# Patient Record
Sex: MALE | Race: WHITE | NOT HISPANIC OR LATINO | Employment: OTHER | URBAN - METROPOLITAN AREA
[De-identification: names, ages, dates, MRNs, and addresses within clinical notes are randomized per-mention and may not be internally consistent; named-entity substitution may affect disease eponyms.]

---

## 2022-07-07 ENCOUNTER — APPOINTMENT (OUTPATIENT)
Dept: RADIOLOGY | Facility: MEDICAL CENTER | Age: 77
End: 2022-07-07
Attending: EMERGENCY MEDICINE
Payer: COMMERCIAL

## 2022-07-07 ENCOUNTER — HOSPITAL ENCOUNTER (OUTPATIENT)
Facility: MEDICAL CENTER | Age: 77
End: 2022-07-09
Attending: EMERGENCY MEDICINE | Admitting: STUDENT IN AN ORGANIZED HEALTH CARE EDUCATION/TRAINING PROGRAM
Payer: COMMERCIAL

## 2022-07-07 DIAGNOSIS — K57.92 DIVERTICULITIS: ICD-10-CM

## 2022-07-07 DIAGNOSIS — R79.89 ELEVATED TROPONIN: ICD-10-CM

## 2022-07-07 LAB
ALBUMIN SERPL BCP-MCNC: 4.3 G/DL (ref 3.2–4.9)
ALBUMIN/GLOB SERPL: 1.4 G/DL
ALP SERPL-CCNC: 69 U/L (ref 30–99)
ALT SERPL-CCNC: 15 U/L (ref 2–50)
ANION GAP SERPL CALC-SCNC: 12 MMOL/L (ref 7–16)
AST SERPL-CCNC: 12 U/L (ref 12–45)
BASOPHILS # BLD AUTO: 0.4 % (ref 0–1.8)
BASOPHILS # BLD: 0.08 K/UL (ref 0–0.12)
BILIRUB SERPL-MCNC: 1.4 MG/DL (ref 0.1–1.5)
BUN SERPL-MCNC: 20 MG/DL (ref 8–22)
CALCIUM SERPL-MCNC: 9.3 MG/DL (ref 8.5–10.5)
CHLORIDE SERPL-SCNC: 102 MMOL/L (ref 96–112)
CO2 SERPL-SCNC: 23 MMOL/L (ref 20–33)
CREAT SERPL-MCNC: 1.23 MG/DL (ref 0.5–1.4)
EOSINOPHIL # BLD AUTO: 0.08 K/UL (ref 0–0.51)
EOSINOPHIL NFR BLD: 0.4 % (ref 0–6.9)
ERYTHROCYTE [DISTWIDTH] IN BLOOD BY AUTOMATED COUNT: 46.3 FL (ref 35.9–50)
GFR SERPLBLD CREATININE-BSD FMLA CKD-EPI: 60 ML/MIN/1.73 M 2
GLOBULIN SER CALC-MCNC: 3.1 G/DL (ref 1.9–3.5)
GLUCOSE SERPL-MCNC: 98 MG/DL (ref 65–99)
HCT VFR BLD AUTO: 44.9 % (ref 42–52)
HGB BLD-MCNC: 15.6 G/DL (ref 14–18)
IMM GRANULOCYTES # BLD AUTO: 0.11 K/UL (ref 0–0.11)
IMM GRANULOCYTES NFR BLD AUTO: 0.6 % (ref 0–0.9)
LYMPHOCYTES # BLD AUTO: 1.63 K/UL (ref 1–4.8)
LYMPHOCYTES NFR BLD: 9.1 % (ref 22–41)
MCH RBC QN AUTO: 32.2 PG (ref 27–33)
MCHC RBC AUTO-ENTMCNC: 34.7 G/DL (ref 33.7–35.3)
MCV RBC AUTO: 92.8 FL (ref 81.4–97.8)
MONOCYTES # BLD AUTO: 1.18 K/UL (ref 0–0.85)
MONOCYTES NFR BLD AUTO: 6.6 % (ref 0–13.4)
NEUTROPHILS # BLD AUTO: 14.82 K/UL (ref 1.82–7.42)
NEUTROPHILS NFR BLD: 82.9 % (ref 44–72)
NRBC # BLD AUTO: 0 K/UL
NRBC BLD-RTO: 0 /100 WBC
PLATELET # BLD AUTO: 346 K/UL (ref 164–446)
PMV BLD AUTO: 9.1 FL (ref 9–12.9)
POTASSIUM SERPL-SCNC: 4.5 MMOL/L (ref 3.6–5.5)
PROT SERPL-MCNC: 7.4 G/DL (ref 6–8.2)
RBC # BLD AUTO: 4.84 M/UL (ref 4.7–6.1)
SODIUM SERPL-SCNC: 137 MMOL/L (ref 135–145)
WBC # BLD AUTO: 17.9 K/UL (ref 4.8–10.8)

## 2022-07-07 PROCEDURE — 99285 EMERGENCY DEPT VISIT HI MDM: CPT

## 2022-07-07 PROCEDURE — 36415 COLL VENOUS BLD VENIPUNCTURE: CPT

## 2022-07-07 PROCEDURE — 83605 ASSAY OF LACTIC ACID: CPT

## 2022-07-07 PROCEDURE — 84484 ASSAY OF TROPONIN QUANT: CPT

## 2022-07-07 PROCEDURE — 74176 CT ABD & PELVIS W/O CONTRAST: CPT | Mod: ME

## 2022-07-07 PROCEDURE — 93005 ELECTROCARDIOGRAM TRACING: CPT | Performed by: EMERGENCY MEDICINE

## 2022-07-07 PROCEDURE — 85025 COMPLETE CBC W/AUTO DIFF WBC: CPT

## 2022-07-07 PROCEDURE — 87077 CULTURE AEROBIC IDENTIFY: CPT

## 2022-07-07 PROCEDURE — 87040 BLOOD CULTURE FOR BACTERIA: CPT

## 2022-07-07 PROCEDURE — 80053 COMPREHEN METABOLIC PANEL: CPT

## 2022-07-07 PROCEDURE — 93005 ELECTROCARDIOGRAM TRACING: CPT

## 2022-07-07 PROCEDURE — 87150 DNA/RNA AMPLIFIED PROBE: CPT

## 2022-07-07 PROCEDURE — C9803 HOPD COVID-19 SPEC COLLECT: HCPCS | Performed by: EMERGENCY MEDICINE

## 2022-07-07 PROCEDURE — 0241U HCHG SARS-COV-2 COVID-19 NFCT DS RESP RNA 4 TRGT MIC: CPT

## 2022-07-07 PROCEDURE — 71045 X-RAY EXAM CHEST 1 VIEW: CPT

## 2022-07-07 ASSESSMENT — LIFESTYLE VARIABLES: DO YOU DRINK ALCOHOL: NO

## 2022-07-08 PROBLEM — Z72.0 TOBACCO ABUSE: Status: ACTIVE | Noted: 2022-07-08

## 2022-07-08 PROBLEM — N40.0 BPH (BENIGN PROSTATIC HYPERPLASIA): Status: ACTIVE | Noted: 2022-07-08

## 2022-07-08 PROBLEM — D72.9 NEUTROPHILIC LEUKOCYTOSIS: Status: ACTIVE | Noted: 2022-07-08

## 2022-07-08 PROBLEM — K57.92 DIVERTICULITIS: Status: ACTIVE | Noted: 2022-07-08

## 2022-07-08 LAB
EKG IMPRESSION: NORMAL
FLUAV RNA SPEC QL NAA+PROBE: NEGATIVE
FLUBV RNA SPEC QL NAA+PROBE: NEGATIVE
LACTATE SERPL-SCNC: 1.3 MMOL/L (ref 0.5–2)
RSV RNA SPEC QL NAA+PROBE: NEGATIVE
SARS-COV-2 RNA RESP QL NAA+PROBE: NOTDETECTED
SPECIMEN SOURCE: NORMAL
TROPONIN T SERPL-MCNC: 22 NG/L (ref 6–19)

## 2022-07-08 PROCEDURE — 99220 PR INITIAL OBSERVATION CARE,LEVL III: CPT | Performed by: STUDENT IN AN ORGANIZED HEALTH CARE EDUCATION/TRAINING PROGRAM

## 2022-07-08 PROCEDURE — 700105 HCHG RX REV CODE 258: Performed by: STUDENT IN AN ORGANIZED HEALTH CARE EDUCATION/TRAINING PROGRAM

## 2022-07-08 PROCEDURE — 700101 HCHG RX REV CODE 250: Performed by: STUDENT IN AN ORGANIZED HEALTH CARE EDUCATION/TRAINING PROGRAM

## 2022-07-08 PROCEDURE — G0378 HOSPITAL OBSERVATION PER HR: HCPCS

## 2022-07-08 PROCEDURE — 96372 THER/PROPH/DIAG INJ SC/IM: CPT | Mod: XU

## 2022-07-08 PROCEDURE — 96366 THER/PROPH/DIAG IV INF ADDON: CPT

## 2022-07-08 PROCEDURE — 700111 HCHG RX REV CODE 636 W/ 250 OVERRIDE (IP): Performed by: STUDENT IN AN ORGANIZED HEALTH CARE EDUCATION/TRAINING PROGRAM

## 2022-07-08 PROCEDURE — 700102 HCHG RX REV CODE 250 W/ 637 OVERRIDE(OP): Performed by: STUDENT IN AN ORGANIZED HEALTH CARE EDUCATION/TRAINING PROGRAM

## 2022-07-08 PROCEDURE — 87040 BLOOD CULTURE FOR BACTERIA: CPT

## 2022-07-08 PROCEDURE — 96365 THER/PROPH/DIAG IV INF INIT: CPT

## 2022-07-08 PROCEDURE — A9270 NON-COVERED ITEM OR SERVICE: HCPCS | Performed by: STUDENT IN AN ORGANIZED HEALTH CARE EDUCATION/TRAINING PROGRAM

## 2022-07-08 PROCEDURE — 96375 TX/PRO/DX INJ NEW DRUG ADDON: CPT

## 2022-07-08 PROCEDURE — 96376 TX/PRO/DX INJ SAME DRUG ADON: CPT

## 2022-07-08 RX ORDER — TAMSULOSIN HYDROCHLORIDE 0.4 MG/1
0.4 CAPSULE ORAL
Status: DISCONTINUED | OUTPATIENT
Start: 2022-07-08 | End: 2022-07-09 | Stop reason: HOSPADM

## 2022-07-08 RX ORDER — TRIAMCINOLONE ACETONIDE 5 MG/G
OINTMENT TOPICAL 2 TIMES DAILY
COMMUNITY
Start: 2022-04-05 | End: 2022-11-28

## 2022-07-08 RX ORDER — SODIUM CHLORIDE, SODIUM LACTATE, POTASSIUM CHLORIDE, CALCIUM CHLORIDE 600; 310; 30; 20 MG/100ML; MG/100ML; MG/100ML; MG/100ML
INJECTION, SOLUTION INTRAVENOUS CONTINUOUS
Status: DISCONTINUED | OUTPATIENT
Start: 2022-07-08 | End: 2022-07-08

## 2022-07-08 RX ORDER — METRONIDAZOLE 500 MG/100ML
500 INJECTION, SOLUTION INTRAVENOUS EVERY 8 HOURS
Status: DISCONTINUED | OUTPATIENT
Start: 2022-07-08 | End: 2022-07-09 | Stop reason: HOSPADM

## 2022-07-08 RX ORDER — HEPARIN SODIUM 5000 [USP'U]/ML
5000 INJECTION, SOLUTION INTRAVENOUS; SUBCUTANEOUS EVERY 8 HOURS
Status: DISCONTINUED | OUTPATIENT
Start: 2022-07-08 | End: 2022-07-09 | Stop reason: HOSPADM

## 2022-07-08 RX ORDER — TRAZODONE HYDROCHLORIDE 150 MG/1
150 TABLET ORAL NIGHTLY PRN
COMMUNITY
Start: 2021-10-22 | End: 2023-06-20

## 2022-07-08 RX ORDER — TAMSULOSIN HYDROCHLORIDE 0.4 MG/1
0.8 CAPSULE ORAL DAILY
COMMUNITY
Start: 2021-10-22 | End: 2023-06-20

## 2022-07-08 RX ORDER — ONDANSETRON 2 MG/ML
4 INJECTION INTRAMUSCULAR; INTRAVENOUS
Status: DISCONTINUED | OUTPATIENT
Start: 2022-07-08 | End: 2022-07-09 | Stop reason: HOSPADM

## 2022-07-08 RX ORDER — HYDROCODONE BITARTRATE AND ACETAMINOPHEN 10; 325 MG/1; MG/1
1 TABLET ORAL EVERY 6 HOURS PRN
COMMUNITY
Start: 2022-06-09 | End: 2022-11-29

## 2022-07-08 RX ORDER — PANTOPRAZOLE SODIUM 40 MG/1
40 TABLET, DELAYED RELEASE ORAL 2 TIMES DAILY
COMMUNITY
Start: 2021-10-22 | End: 2022-11-28

## 2022-07-08 RX ORDER — FAMOTIDINE 40 MG/1
40 TABLET, FILM COATED ORAL DAILY
COMMUNITY
Start: 2022-05-12 | End: 2022-11-28

## 2022-07-08 RX ORDER — MORPHINE SULFATE 4 MG/ML
1 INJECTION INTRAVENOUS EVERY 4 HOURS PRN
Status: DISCONTINUED | OUTPATIENT
Start: 2022-07-08 | End: 2022-07-09 | Stop reason: HOSPADM

## 2022-07-08 RX ADMIN — METRONIDAZOLE 500 MG: 500 INJECTION, SOLUTION INTRAVENOUS at 15:59

## 2022-07-08 RX ADMIN — METRONIDAZOLE 500 MG: 500 INJECTION, SOLUTION INTRAVENOUS at 22:52

## 2022-07-08 RX ADMIN — MORPHINE SULFATE 1 MG: 4 INJECTION INTRAVENOUS at 03:59

## 2022-07-08 RX ADMIN — METRONIDAZOLE 500 MG: 500 INJECTION, SOLUTION INTRAVENOUS at 03:56

## 2022-07-08 RX ADMIN — TAMSULOSIN HYDROCHLORIDE 0.4 MG: 0.4 CAPSULE ORAL at 09:49

## 2022-07-08 RX ADMIN — CEFTRIAXONE SODIUM 1 G: 10 INJECTION, POWDER, FOR SOLUTION INTRAVENOUS at 04:36

## 2022-07-08 RX ADMIN — MORPHINE SULFATE 1 MG: 4 INJECTION INTRAVENOUS at 20:36

## 2022-07-08 RX ADMIN — HEPARIN SODIUM 5000 UNITS: 5000 INJECTION, SOLUTION INTRAVENOUS; SUBCUTANEOUS at 03:56

## 2022-07-08 RX ADMIN — HEPARIN SODIUM 5000 UNITS: 5000 INJECTION, SOLUTION INTRAVENOUS; SUBCUTANEOUS at 14:49

## 2022-07-08 RX ADMIN — HEPARIN SODIUM 5000 UNITS: 5000 INJECTION, SOLUTION INTRAVENOUS; SUBCUTANEOUS at 22:57

## 2022-07-08 RX ADMIN — SODIUM CHLORIDE, POTASSIUM CHLORIDE, SODIUM LACTATE AND CALCIUM CHLORIDE: 600; 310; 30; 20 INJECTION, SOLUTION INTRAVENOUS at 04:45

## 2022-07-08 ASSESSMENT — LIFESTYLE VARIABLES
HAVE YOU EVER FELT YOU SHOULD CUT DOWN ON YOUR DRINKING: NO
HOW MANY TIMES IN THE PAST YEAR HAVE YOU HAD 5 OR MORE DRINKS IN A DAY: 0
ON A TYPICAL DAY WHEN YOU DRINK ALCOHOL HOW MANY DRINKS DO YOU HAVE: 0
TOTAL SCORE: 0
EVER HAD A DRINK FIRST THING IN THE MORNING TO STEADY YOUR NERVES TO GET RID OF A HANGOVER: NO
TOTAL SCORE: 0
CONSUMPTION TOTAL: NEGATIVE
ALCOHOL_USE: YES
EVER FELT BAD OR GUILTY ABOUT YOUR DRINKING: NO
TOTAL SCORE: 0
AVERAGE NUMBER OF DAYS PER WEEK YOU HAVE A DRINK CONTAINING ALCOHOL: 0
HAVE PEOPLE ANNOYED YOU BY CRITICIZING YOUR DRINKING: NO

## 2022-07-08 ASSESSMENT — ENCOUNTER SYMPTOMS
MUSCULOSKELETAL NEGATIVE: 1
PSYCHIATRIC NEGATIVE: 1
NEUROLOGICAL NEGATIVE: 1
SHORTNESS OF BREATH: 1
ABDOMINAL PAIN: 1
COUGH: 1
NAUSEA: 1
CARDIOVASCULAR NEGATIVE: 1
EYES NEGATIVE: 1

## 2022-07-08 ASSESSMENT — PATIENT HEALTH QUESTIONNAIRE - PHQ9
SUM OF ALL RESPONSES TO PHQ9 QUESTIONS 1 AND 2: 0
2. FEELING DOWN, DEPRESSED, IRRITABLE, OR HOPELESS: NOT AT ALL
1. LITTLE INTEREST OR PLEASURE IN DOING THINGS: NOT AT ALL

## 2022-07-08 ASSESSMENT — PAIN DESCRIPTION - PAIN TYPE
TYPE: CHRONIC PAIN
TYPE: CHRONIC PAIN

## 2022-07-08 NOTE — ED TRIAGE NOTES
Chief Complaint   Patient presents with   • Diarrhea     x10 days   • Nausea     x10 days w/o vomiting   • Chills     x4 days with occasional   • Cough     x7 days; wet-sounding   • Shortness of Breath     x7 days since pt has been coughing     Pt ambulatory to triage with wife for above complaint. Pt states he called his PCM and they told him to come straight in for evaluation to rule out pneumonia. SOB protocol ordered and labs drawn in triage room. Pt sent for EKG after triage.     Pt is alert/oriented and follows commands. Pt speaking in full sentences and responds appropriately to questions. No acute distress noted in triage and respirations are even and unlabored.     Pt placed in lobby and educated on triage process. Pt and wife encouraged to alert staff for any changes in condition.

## 2022-07-08 NOTE — ED NOTES
Pt resting in [facility], VSS, NAD. Pt updated on POC and has no further requests at this time

## 2022-07-08 NOTE — PROGRESS NOTES
Patient was admitted earlier today. Please refer H&P for details    77 y.o. male who presented 7/7/2022 with lower quadrant abdominal pain for the last week and a half. He was found to have leukocytosis. CT abd shows extensive colonic diverticulosis with probable early or mild diverticulitis in the distal descending colon. COVID test neg.    Plans  - cont iv abx Rocephin and flagyl  - advance diet as tolerated  - follow up blood culture and cbc

## 2022-07-08 NOTE — ED NOTES
Pt resting comfortably in bed, respirations even/unlabored. No acute distress noted at this time.

## 2022-07-08 NOTE — ED PROVIDER NOTES
"ED Provider Note    CHIEF COMPLAINT  Chief Complaint   Patient presents with   • Diarrhea     x10 days   • Nausea     x10 days w/o vomiting   • Chills     x4 days with occasional   • Cough     x7 days; wet-sounding   • Shortness of Breath     x7 days since pt has been coughing       HPI  June Ventura is a 77 y.o. male who presents to the emergency department complaining of nausea, vomiting, diarrhea in addition to fatigue, chills, riders. Also notes some cough and shortness of breath. All of the above has been over the last 1 to 2 weeks. States that he is otherwise healthy and does not take any prescription medications. Chronic smoker. Occasional drinker. No prior evaluation by cardiology. No local or current PCP.    REVIEW OF SYSTEMS  See HPI for further details. All other systems are negative.     PAST MEDICAL HISTORY   has a past medical history of Chronic back pain.    SOCIAL HISTORY  Social History     Tobacco Use   • Smoking status: Current Every Day Smoker     Types: Cigarettes   • Smokeless tobacco: Never Used   Vaping Use   • Vaping Use: Never used   Substance and Sexual Activity   • Alcohol use: Yes     Comment: \"a beer every 3-4 months\"   • Drug use: Not Currently   • Sexual activity: Not on file       SURGICAL HISTORY   has a past surgical history that includes other orthopedic surgery (1986) and appendectomy.    CURRENT MEDICATIONS  Home Medications     Reviewed by Imelda Mendiola R.N. (Registered Nurse) on 07/07/22 at 2112  Med List Status: Not Addressed   Medication Last Dose Status        Patient David Taking any Medications                       ALLERGIES  Allergies   Allergen Reactions   • Talwin Unspecified     aggression and violence       PHYSICAL EXAM  VITAL SIGNS: /59   Pulse 77   Temp 36.2 °C (97.1 °F) (Temporal)   Resp 18   Ht 1.702 m (5' 7\")   Wt 74.2 kg (163 lb 9.3 oz)   SpO2 97%   BMI 25.62 kg/m²  @BEBA[[id number]::@   Pulse ox interpretation: I interpret this pulse ox as " normal.  Constitutional: Alert in no apparent distress.  HENT: No signs of trauma, Bilateral external ears normal, Nose normal.   Eyes: Pupils are equal and reactive  Neck: Normal range of motion, No tenderness, Supple  Cardiovascular: Regular rate and rhythm, no murmurs.   Thorax & Lungs: Normal breath sounds, No respiratory distress, No wheezing, No chest tenderness.   Abdomen: Bowel sounds normal, Soft, + LLQ tenderness  Skin: Warm, Dry, No erythema, No rash.   Extremities: Intact distal pulses  Musculoskeletal: Good range of motion in all major joints. No tenderness to palpation or major deformities noted.   Neurologic: Alert , Normal motor function, Normal sensory function, No focal deficits noted.   Psychiatric: Affect normal, Judgment normal, Mood normal.       DIAGNOSTIC STUDIES / PROCEDURES      LABS  Results for orders placed or performed during the hospital encounter of 07/07/22   CBC with Differential   Result Value Ref Range    WBC 17.9 (H) 4.8 - 10.8 K/uL    RBC 4.84 4.70 - 6.10 M/uL    Hemoglobin 15.6 14.0 - 18.0 g/dL    Hematocrit 44.9 42.0 - 52.0 %    MCV 92.8 81.4 - 97.8 fL    MCH 32.2 27.0 - 33.0 pg    MCHC 34.7 33.7 - 35.3 g/dL    RDW 46.3 35.9 - 50.0 fL    Platelet Count 346 164 - 446 K/uL    MPV 9.1 9.0 - 12.9 fL    Neutrophils-Polys 82.90 (H) 44.00 - 72.00 %    Lymphocytes 9.10 (L) 22.00 - 41.00 %    Monocytes 6.60 0.00 - 13.40 %    Eosinophils 0.40 0.00 - 6.90 %    Basophils 0.40 0.00 - 1.80 %    Immature Granulocytes 0.60 0.00 - 0.90 %    Nucleated RBC 0.00 /100 WBC    Neutrophils (Absolute) 14.82 (H) 1.82 - 7.42 K/uL    Lymphs (Absolute) 1.63 1.00 - 4.80 K/uL    Monos (Absolute) 1.18 (H) 0.00 - 0.85 K/uL    Eos (Absolute) 0.08 0.00 - 0.51 K/uL    Baso (Absolute) 0.08 0.00 - 0.12 K/uL    Immature Granulocytes (abs) 0.11 0.00 - 0.11 K/uL    NRBC (Absolute) 0.00 K/uL   Comp Metabolic Panel   Result Value Ref Range    Sodium 137 135 - 145 mmol/L    Potassium 4.5 3.6 - 5.5 mmol/L    Chloride 102  96 - 112 mmol/L    Co2 23 20 - 33 mmol/L    Anion Gap 12.0 7.0 - 16.0    Glucose 98 65 - 99 mg/dL    Bun 20 8 - 22 mg/dL    Creatinine 1.23 0.50 - 1.40 mg/dL    Calcium 9.3 8.5 - 10.5 mg/dL    AST(SGOT) 12 12 - 45 U/L    ALT(SGPT) 15 2 - 50 U/L    Alkaline Phosphatase 69 30 - 99 U/L    Total Bilirubin 1.4 0.1 - 1.5 mg/dL    Albumin 4.3 3.2 - 4.9 g/dL    Total Protein 7.4 6.0 - 8.2 g/dL    Globulin 3.1 1.9 - 3.5 g/dL    A-G Ratio 1.4 g/dL   ESTIMATED GFR   Result Value Ref Range    GFR (CKD-EPI) 60 >60 mL/min/1.73 m 2   Troponin   Result Value Ref Range    Troponin T 22 (H) 6 - 19 ng/L   Lactic Acid   Result Value Ref Range    Lactic Acid 1.3 0.5 - 2.0 mmol/L   COV-2, FLU A/B, AND RSV BY PCR (2-4 HOURS CEPHEID): Collect NP swab in VTM    Specimen: Nasopharyngeal; Respirate   Result Value Ref Range    Influenza virus A RNA Negative Negative    Influenza virus B, PCR Negative Negative    RSV, PCR Negative Negative    SARS-CoV-2 by PCR NotDetected     SARS-CoV-2 Source NP Swab    EKG   Result Value Ref Range    Report       [facility] Emergency Dept.    Test Date:  2022  Pt Name:    BOOGIE ALFORD                  Department: ER  MRN:        [id number]                      Room:  Gender:     Male                         Technician: [id number]  :        1945                   Requested By:ER TRIAGE PROTOCOL  Order #:    [id number]                    Reading MD: Freedom Rosenthal    Measurements  Intervals                                Axis  Rate:       79                           P:          67  MS:         178                          QRS:        28  QRSD:       81                           T:          63  QT:         357  QTc:        410    Interpretive Statements  Sinus rhythm  Probable left atrial enlargement  ST elevation, consider lateral injury  No previous ECG available for comparison  Electronically Signed On 2022 1:16:15 PDT by Freedom Rosenthal           RADIOLOGY  CT-ABDOMEN-PELVIS  W/O   Final Result      1.  Extensive colonic diverticulosis with probable early or mild diverticulitis in the distal descending colon.   2.  No evidence of bowel obstruction or perforation.   3.  Gallbladder full of stones.   4.  Diffuse atherosclerotic changes and mild ectasia of the abdominal aorta measuring up to 3 cm in diameter.      DX-CHEST-PORTABLE (1 VIEW)   Final Result      No acute cardiopulmonary disease.              COURSE & MEDICAL DECISION MAKING  Pertinent Labs & Imaging studies reviewed. (See chart for details)  77-year-old male presented emergency department with the above presentation. Workup significant for diverticulitis. Also significant leukocytosis. Chest x-ray negative. No other findings of infection however given his current clinical appearance, elevated troponin and bowel inflammatory process I will have brought into the hospital service for ongoing inpatient care and workup.      FINAL IMPRESSION  1. Diverticulitis    2. Elevated troponin            Electronically signed by: Freedom Rosenthal M.D., 7/8/2022 1:45 AM

## 2022-07-08 NOTE — ED NOTES
Report from NOC RN. Pt resting in [facility], NAD. Pt updated on POC and has no further requests at this time

## 2022-07-08 NOTE — H&P
Hospital Medicine History & Physical Note    Date of Service  7/8/2022    Primary Care Physician  No primary care provider on file.    Consultants  None    Code Status  Full Code    Chief Complaint  Chief Complaint   Patient presents with   • Diarrhea     x10 days   • Nausea     x10 days w/o vomiting   • Chills     x4 days with occasional   • Cough     x7 days; wet-sounding   • Shortness of Breath     x7 days since pt has been coughing       History of Presenting Illness  June Ventura is a 77 y.o. male who presented 7/7/2022 with lower quadrant abdominal pain for the last week and a half.  States that it is sharp comes and goes.  Associated with nausea and belching, however no vomitus.  He also endorses dry cough and intermittent shortness of breath.  He initially presented to [place] several days ago for the symptoms.  He was immediately quarantined for suspicion of COVID and was discharged when his test came back -4 days later.  He was then sent home, to which his symptoms still persisted inside to come to [facility] for further evaluation.    Patient has an extensive smoking history, has been smoking for last 60 years.  Has never been tested for COPD.  Denies alcohol or illicit drug use.    In the ED, patient found abnormal vital signs.  Remarkable labs include neutrophilic leukocytosis.  COVID test negative.  Chest x-ray negative for acute cardiopulmonary abnormality.  CAT scan abdomen showed extensive colonic diverticulosis with probable early or mild diverticulitis in the distal descending colon.    I discussed the plan of care with patient.    Review of Systems  Review of Systems   Constitutional: Positive for malaise/fatigue.   HENT: Negative.    Eyes: Negative.    Respiratory: Positive for cough and shortness of breath.    Cardiovascular: Negative.    Gastrointestinal: Positive for abdominal pain and nausea.   Genitourinary: Negative.    Musculoskeletal: Negative.    Skin: Negative.    Neurological:  Negative.    Endo/Heme/Allergies: Negative.    Psychiatric/Behavioral: Negative.        Past Medical History   has a past medical history of Chronic back pain.    Surgical History   has a past surgical history that includes other orthopedic surgery (1986) and appendectomy.     Family History   Family history reviewed with patient. There is no family history that is pertinent to the chief complaint.     Social History   reports that he has been smoking cigarettes. He has never used smokeless tobacco. He reports current alcohol use. He reports previous drug use.    Allergies  Allergies   Allergen Reactions   • Talwin Unspecified     aggression and violence       Medications  None       Physical Exam  Temp:  [36.2 °C (97.1 °F)] 36.2 °C (97.1 °F)  Pulse:  [77-88] 77  Resp:  [16-18] 18  BP: (106-125)/(59-61) 125/59  SpO2:  [97 %-98 %] 97 %  Blood Pressure : 125/59   Temperature: 36.2 °C (97.1 °F)   Pulse: 77   Respiration: 18   Pulse Oximetry: 97 %       Physical Exam  Constitutional:       Appearance: Normal appearance. He is normal weight.   HENT:      Head: Normocephalic.      Nose: Nose normal.      Mouth/Throat:      Mouth: Mucous membranes are moist.   Eyes:      Pupils: Pupils are equal, round, and reactive to light.   Cardiovascular:      Rate and Rhythm: Normal rate and regular rhythm.   Pulmonary:      Effort: Pulmonary effort is normal.      Breath sounds: Normal breath sounds.   Abdominal:      General: Abdomen is flat. Bowel sounds are normal.      Palpations: Abdomen is soft.      Comments: Endorses pain upon palpation of bilateral lower quadrants, more prominent in the left lower quadrant.  No signs of rigidity.  No rebound tenderness.  No guarding.   Musculoskeletal:         General: Normal range of motion.      Cervical back: Normal range of motion.   Skin:     General: Skin is warm.   Neurological:      General: No focal deficit present.      Mental Status: He is alert. Mental status is at baseline.          Laboratory:  Recent Labs     07/07/22 1915   WBC 17.9*   RBC 4.84   HEMOGLOBIN 15.6   HEMATOCRIT 44.9   MCV 92.8   MCH 32.2   MCHC 34.7   RDW 46.3   PLATELETCT 346   MPV 9.1     Recent Labs     07/07/22 1915   SODIUM 137   POTASSIUM 4.5   CHLORIDE 102   CO2 23   GLUCOSE 98   BUN 20   CREATININE 1.23   CALCIUM 9.3     Recent Labs     07/07/22 1915   ALTSGPT 15   ASTSGOT 12   ALKPHOSPHAT 69   TBILIRUBIN 1.4   GLUCOSE 98         No results for input(s): NTPROBNP in the last 72 hours.      Recent Labs     07/07/22  2339   TROPONINT 22*       Imaging:  CT-ABDOMEN-PELVIS W/O   Final Result      1.  Extensive colonic diverticulosis with probable early or mild diverticulitis in the distal descending colon.   2.  No evidence of bowel obstruction or perforation.   3.  Gallbladder full of stones.   4.  Diffuse atherosclerotic changes and mild ectasia of the abdominal aorta measuring up to 3 cm in diameter.      DX-CHEST-PORTABLE (1 VIEW)   Final Result      No acute cardiopulmonary disease.          no X-Ray or EKG requiring interpretation    Assessment/Plan:  Justification for Admission Status  I anticipate this patient is appropriate for observation status at this time because He is diverticulitis and is able to tolerate p.o. benign abdominal exam.  Likely discharge tomorrow    * Diverticulitis  Assessment & Plan  Admit patient to medical floor  Rocephin flagyl  Fluids  Pain medications  Serial abdominal exam    BPH (benign prostatic hyperplasia)  Assessment & Plan  Continue Flomax    Tobacco abuse  Assessment & Plan  Counseled on benefits of tobacco cessation  Will need to have evaluation for COPD outpatient    Neutrophilic leukocytosis  Assessment & Plan  Likely from diverticulitis   Serial CBC      VTE prophylaxis: heparin ppx

## 2022-07-08 NOTE — ED NOTES
Pt medicated per MAR, pt tolerated well. Pt requesting blankets, socks and juice. Pt accommodated for comfort. Pt denies any other needs at this time. NAD.

## 2022-07-09 ENCOUNTER — PHARMACY VISIT (OUTPATIENT)
Dept: PHARMACY | Facility: MEDICAL CENTER | Age: 77
End: 2022-07-09
Payer: COMMERCIAL

## 2022-07-09 VITALS
TEMPERATURE: 97.7 F | HEIGHT: 67 IN | OXYGEN SATURATION: 94 % | SYSTOLIC BLOOD PRESSURE: 119 MMHG | HEART RATE: 74 BPM | RESPIRATION RATE: 16 BRPM | WEIGHT: 163.58 LBS | BODY MASS INDEX: 25.67 KG/M2 | DIASTOLIC BLOOD PRESSURE: 50 MMHG

## 2022-07-09 LAB
ERYTHROCYTE [DISTWIDTH] IN BLOOD BY AUTOMATED COUNT: 44.5 FL (ref 35.9–50)
HCT VFR BLD AUTO: 38.7 % (ref 42–52)
HGB BLD-MCNC: 13.6 G/DL (ref 14–18)
MCH RBC QN AUTO: 32.2 PG (ref 27–33)
MCHC RBC AUTO-ENTMCNC: 35.1 G/DL (ref 33.7–35.3)
MCV RBC AUTO: 91.5 FL (ref 81.4–97.8)
PLATELET # BLD AUTO: 296 K/UL (ref 164–446)
PMV BLD AUTO: 9.1 FL (ref 9–12.9)
RBC # BLD AUTO: 4.23 M/UL (ref 4.7–6.1)
TROPONIN T SERPL-MCNC: 19 NG/L (ref 6–19)
WBC # BLD AUTO: 7.8 K/UL (ref 4.8–10.8)

## 2022-07-09 PROCEDURE — 85027 COMPLETE CBC AUTOMATED: CPT

## 2022-07-09 PROCEDURE — 700111 HCHG RX REV CODE 636 W/ 250 OVERRIDE (IP): Performed by: STUDENT IN AN ORGANIZED HEALTH CARE EDUCATION/TRAINING PROGRAM

## 2022-07-09 PROCEDURE — RXMED WILLOW AMBULATORY MEDICATION CHARGE: Performed by: STUDENT IN AN ORGANIZED HEALTH CARE EDUCATION/TRAINING PROGRAM

## 2022-07-09 PROCEDURE — 36415 COLL VENOUS BLD VENIPUNCTURE: CPT

## 2022-07-09 PROCEDURE — G0378 HOSPITAL OBSERVATION PER HR: HCPCS

## 2022-07-09 PROCEDURE — A9270 NON-COVERED ITEM OR SERVICE: HCPCS | Performed by: NURSE PRACTITIONER

## 2022-07-09 PROCEDURE — 700102 HCHG RX REV CODE 250 W/ 637 OVERRIDE(OP): Performed by: STUDENT IN AN ORGANIZED HEALTH CARE EDUCATION/TRAINING PROGRAM

## 2022-07-09 PROCEDURE — A9270 NON-COVERED ITEM OR SERVICE: HCPCS | Performed by: STUDENT IN AN ORGANIZED HEALTH CARE EDUCATION/TRAINING PROGRAM

## 2022-07-09 PROCEDURE — 700102 HCHG RX REV CODE 250 W/ 637 OVERRIDE(OP): Performed by: NURSE PRACTITIONER

## 2022-07-09 PROCEDURE — 84484 ASSAY OF TROPONIN QUANT: CPT

## 2022-07-09 PROCEDURE — 99217 PR OBSERVATION CARE DISCHARGE: CPT | Performed by: STUDENT IN AN ORGANIZED HEALTH CARE EDUCATION/TRAINING PROGRAM

## 2022-07-09 PROCEDURE — 700101 HCHG RX REV CODE 250: Performed by: STUDENT IN AN ORGANIZED HEALTH CARE EDUCATION/TRAINING PROGRAM

## 2022-07-09 PROCEDURE — 87040 BLOOD CULTURE FOR BACTERIA: CPT

## 2022-07-09 RX ORDER — AMOXICILLIN AND CLAVULANATE POTASSIUM 875; 125 MG/1; MG/1
1 TABLET, FILM COATED ORAL 2 TIMES DAILY
Qty: 10 TABLET | Refills: 0 | Status: SHIPPED | OUTPATIENT
Start: 2022-07-09 | End: 2022-07-14

## 2022-07-09 RX ORDER — TRAZODONE HYDROCHLORIDE 150 MG/1
150 TABLET ORAL ONCE
Status: COMPLETED | OUTPATIENT
Start: 2022-07-09 | End: 2022-07-09

## 2022-07-09 RX ADMIN — TRAZODONE HYDROCHLORIDE 150 MG: 150 TABLET ORAL at 03:11

## 2022-07-09 RX ADMIN — CEFTRIAXONE SODIUM 1 G: 10 INJECTION, POWDER, FOR SOLUTION INTRAVENOUS at 06:22

## 2022-07-09 RX ADMIN — METRONIDAZOLE 500 MG: 500 INJECTION, SOLUTION INTRAVENOUS at 13:32

## 2022-07-09 RX ADMIN — TAMSULOSIN HYDROCHLORIDE 0.4 MG: 0.4 CAPSULE ORAL at 07:30

## 2022-07-09 RX ADMIN — METRONIDAZOLE 500 MG: 500 INJECTION, SOLUTION INTRAVENOUS at 07:29

## 2022-07-09 ASSESSMENT — PAIN DESCRIPTION - PAIN TYPE: TYPE: ACUTE PAIN

## 2022-07-09 NOTE — PROGRESS NOTES
Pt arrive to ppu 106, pt is a&o, respirations regular and non labored. Oriented to unit and updated with plan of care. Call light in reach and encourage to call for assistance.

## 2022-07-09 NOTE — PROGRESS NOTES
D/C instructions and upcoming appointments discussed with pt.  PIV removed. Pt discharging with family member.

## 2022-07-09 NOTE — PROGRESS NOTES
4 Eyes Skin Assessment Completed by MARCIANO Prasad and MARCIANO Hollins.    Head WDL  Ears Redness and Blanching  Nose WDL  Mouth WDL  Neck WDL  Breast/Chest WDL  Shoulder Blades WDL  Spine WDL  (R) Arm/Elbow/Hand WDL  (L) Arm/Elbow/Hand WDL  Abdomen WDL  Groin WDL  Scrotum/Coccyx/Buttocks WDL  (R) Leg WDL, generalized swelling  (L) Leg WDL, generalized swelling  (R) Heel/Foot/Toe WDL, dry  (L) Heel/Foot/Toe WDL, dry      Devices In Places Blood Pressure Cuff      Interventions In Place Pillows and Pressure Redistribution Mattress    Possible Skin Injury No    Pictures Uploaded Into Epic N/A  Wound Consult Placed N/A  RN Wound Prevention Protocol Ordered No

## 2022-07-09 NOTE — CARE PLAN
The patient is Stable - Low risk of patient condition declining or worsening    Shift Goals  Clinical Goals: pain medication, iv abx  Patient Goals: Rest, mobility  Family Goals: N/A    Progress made toward(s) clinical / shift goals: POC/medications discussed with pt. Pt reported of 7/10 of chronic back pain. Medicated per MAR for pain    Patient is not progressing towards the following goals:      Problem: Knowledge Deficit - Standard  Goal: Patient and family/care givers will demonstrate understanding of plan of care, disease process/condition, diagnostic tests and medications  Outcome: Progressing     Problem: Pain - Standard  Goal: Alleviation of pain or a reduction in pain to the patient’s comfort goal  Outcome: Progressing

## 2022-07-09 NOTE — DISCHARGE INSTRUCTIONS
Discharge Instructions per Bethany Garcia M.D.    Please taking antibiotics as prescribed. Please follow-up with PCP as outpatient and follow up with GI for colonoscopy in 4 weeks  You are noted to have 1 of 2 positive blood culture. Repeated blood culture ordered. If abnormal, you will receive a call from us for further instruction. Please keep your phone with you  Take stool softner and avoid constipation    DIET: high fiber diet    ACTIVITY: As tolerated    DIAGNOSIS:diverticulitis,     Return to ER in the event of new or worsening symptoms. Please note importance of compliance and the patient has agreed to proceed with all medical recommendations and follow up plan indicated above. All medications come with benefits and risks. Risks may include permanent injury or death and these risks can be minimized with close reassessment and monitoring. Please make it to your scheduled follow ups with PCP and GI        Discharge Instructions    Discharged to home by car with relative. Discharged via wheelchair, hospital escort: Yes.  Special equipment needed: Not Applicable    Be sure to schedule a follow-up appointment with your primary care doctor or any specialists as instructed.     Discharge Plan:   Diet Plan: Discussed  Activity Level: Discussed  Confirmed Follow up Appointment: No (Comments)  Confirmed Symptoms Management: Discussed  Medication Reconciliation Updated: Yes    I understand that a diet low in cholesterol, fat, and sodium is recommended for good health. Unless I have been given specific instructions below for another diet, I accept this instruction as my diet prescription.   Other diet: reg    Special Instructions: None    -Is this patient being discharged with medication to prevent blood clots?  No    Is patient discharged on Warfarin / Coumadin?   No

## 2022-07-09 NOTE — DISCHARGE SUMMARY
Discharge Summary    CHIEF COMPLAINT ON ADMISSION  Chief Complaint   Patient presents with   • Diarrhea     x10 days   • Nausea     x10 days w/o vomiting   • Chills     x4 days with occasional   • Cough     x7 days; wet-sounding   • Shortness of Breath     x7 days since pt has been coughing       Reason for Admission  Sent By MD     Admission Date  7/7/2022    CODE STATUS  Full Code    HPI & HOSPITAL COURSE  This is a 77 y.o. male who presented 7/7/2022 with lower quadrant abdominal pain for the last week and a half. He was found to have leukocytosis. CT abd shows extensive colonic diverticulosis with probable early or mild diverticulitis in the distal descending colon. COVID test neg.     He was started with iv ceftriaxone and flagyl. Patient reports LLQ pain improving and tolerating diet. No fever, nausea or vomiting. He is prescribed with Augmentin to complete 7 days course. Patient states his last colonoscopy was in [place] in 2020 or 2021. He is advised to follow up with GI in 4-6 weeks. GI has been referred.     Of note, there are 1/2 positive of blood culture MSSA on 7/7. Suspecting contamination. Repeated blood culture pending. Given his significant improvement of symptoms, no sign of sepsis at this point, I will discharge patient home today and keep following repeat blood culture. If repeated blood culture positive, I will call patient to come back to ER. Patient voiced understanding.     Therefore, he is discharged in fair and stable condition to home with close outpatient follow-up. He is advised to follow up with PCP and GI as outpatient.     The patient met 2-midnight criteria for an inpatient stay at the time of discharge.    Discharge Date  7/9/22    FOLLOW UP ITEMS POST DISCHARGE  PCP  GI     DISCHARGE DIAGNOSES  Principal Problem:    Diverticulitis POA: Unknown  Active Problems:    Neutrophilic leukocytosis POA: Unknown    Tobacco abuse POA: Unknown    BPH (benign prostatic hyperplasia) POA:  Unknown  Resolved Problems:    * No resolved hospital problems. *      FOLLOW UP  No future appointments.  [facility] ([facility]) - Primary Care and Family Medicine  [address] [place] [id number]  [phone]  Follow up in 1 week(s)        MEDICATIONS ON DISCHARGE     Medication List      START taking these medications      Instructions   amoxicillin-clavulanate 875-125 MG Tabs  Commonly known as: AUGMENTIN   Take 1 Tablet by mouth 2 times a day for 5 days.  Dose: 1 Tablet        CONTINUE taking these medications      Instructions   famotidine 40 MG Tabs  Commonly known as: PEPCID   Take 40 mg by mouth every day.  Dose: 40 mg     HYDROcodone/acetaminophen  MG Tabs  Commonly known as: NORCO   Take 1 Tablet by mouth every 6 hours as needed.  Dose: 1 Tablet     pantoprazole 40 MG Tbec  Commonly known as: PROTONIX   Take 40 mg by mouth 2 times a day.  Dose: 40 mg     SANCTURA PO   Take 20 mg by mouth every day.  Dose: 20 mg     tamsulosin 0.4 MG capsule  Commonly known as: FLOMAX   Take 0.8 mg by mouth every day.  Dose: 0.8 mg     traZODone 150 MG Tabs  Commonly known as: DESYREL   Take 150 mg by mouth at bedtime as needed.  Dose: 150 mg     triamcinolone 0.5 % ointment  Commonly known as: ARISTOCORT   Apply  topically 2 times a day.            Allergies  Allergies   Allergen Reactions   • Talwin Unspecified     aggression and violence       DIET  Orders Placed This Encounter   Procedures   • Diet Order Diet: Regular     Standing Status:   Standing     Number of Occurrences:   1     Order Specific Question:   Diet:     Answer:   Regular [1]       ACTIVITY  As tolerated.  Weight bearing as tolerated    CONSULTATIONS  na    PROCEDURES  na    LABORATORY  Lab Results   Component Value Date    SODIUM 137 07/07/2022    POTASSIUM 4.5 07/07/2022    CHLORIDE 102 07/07/2022    CO2 23 07/07/2022    GLUCOSE 98 07/07/2022    BUN 20 07/07/2022    CREATININE 1.23 07/07/2022        Lab Results   Component  Value Date    WBC 7.8 07/09/2022    HEMOGLOBIN 13.6 (L) 07/09/2022    HEMATOCRIT 38.7 (L) 07/09/2022    PLATELETCT 296 07/09/2022      CT-ABDOMEN-PELVIS W/O   Final Result      1.  Extensive colonic diverticulosis with probable early or mild diverticulitis in the distal descending colon.   2.  No evidence of bowel obstruction or perforation.   3.  Gallbladder full of stones.   4.  Diffuse atherosclerotic changes and mild ectasia of the abdominal aorta measuring up to 3 cm in diameter.      DX-CHEST-PORTABLE (1 VIEW)   Final Result      No acute cardiopulmonary disease.          Total time of the discharge process exceeds 33 minutes.

## 2022-07-09 NOTE — PROGRESS NOTES
A/Ox4  RA  +SOB, +cough. Does not use oxygen at home  Chronic back pain 7/10. Medicated per MAR for pain. Abdominal pain LLQ/RLQ. Tender upon palpation.  No nausea/vomiting  Tolerating reg diet  Passing gas, LBM 7/7    BPt resting comfortably in bed. All needs met. POC reviewed Call light in reach. Bed locked in lowest position with 2 upper bed rails up.

## 2022-07-10 LAB
BACTERIA BLD CULT: ABNORMAL
BACTERIA BLD CULT: ABNORMAL
SIGNIFICANT IND 70042: ABNORMAL
SITE SITE: ABNORMAL
SOURCE SOURCE: ABNORMAL

## 2022-07-13 LAB
BACTERIA BLD CULT: NORMAL
SIGNIFICANT IND 70042: NORMAL
SITE SITE: NORMAL
SOURCE SOURCE: NORMAL

## 2022-07-14 LAB
BACTERIA BLD CULT: NORMAL
BACTERIA BLD CULT: NORMAL
SIGNIFICANT IND 70042: NORMAL
SIGNIFICANT IND 70042: NORMAL
SITE SITE: NORMAL
SITE SITE: NORMAL
SOURCE SOURCE: NORMAL
SOURCE SOURCE: NORMAL

## 2022-11-28 ENCOUNTER — APPOINTMENT (OUTPATIENT)
Dept: RADIOLOGY | Facility: MEDICAL CENTER | Age: 77
DRG: 552 | End: 2022-11-28
Attending: EMERGENCY MEDICINE
Payer: MEDICARE

## 2022-11-28 ENCOUNTER — HOSPITAL ENCOUNTER (INPATIENT)
Facility: MEDICAL CENTER | Age: 77
LOS: 1 days | DRG: 552 | End: 2022-11-29
Attending: EMERGENCY MEDICINE | Admitting: HOSPITALIST
Payer: MEDICARE

## 2022-11-28 ENCOUNTER — APPOINTMENT (OUTPATIENT)
Dept: RADIOLOGY | Facility: MEDICAL CENTER | Age: 77
DRG: 552 | End: 2022-11-28
Attending: NEUROLOGICAL SURGERY
Payer: MEDICARE

## 2022-11-28 DIAGNOSIS — S32.020A COMPRESSION FRACTURE OF L2 VERTEBRA, INITIAL ENCOUNTER (HCC): ICD-10-CM

## 2022-11-28 PROBLEM — Z66 DNR (DO NOT RESUSCITATE): Status: ACTIVE | Noted: 2022-11-28

## 2022-11-28 PROBLEM — H90.12 CONDUCTIVE HEARING LOSS, UNILATERAL, LEFT EAR, WITH UNRESTRICTED HEARING ON THE CONTRALATERAL SIDE: Status: ACTIVE | Noted: 2018-12-28
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
## 2022-11-28 PROBLEM — N18.31 STAGE 3A CHRONIC KIDNEY DISEASE: Status: ACTIVE | Noted: 2022-06-27

## 2022-11-28 PROBLEM — S32.000A LUMBAR COMPRESSION FRACTURE, CLOSED, INITIAL ENCOUNTER (HCC): Status: ACTIVE | Noted: 2022-11-28

## 2022-11-28 LAB
ALBUMIN SERPL BCP-MCNC: 4.2 G/DL (ref 3.2–4.9)
ALBUMIN/GLOB SERPL: 1.6 G/DL
ALP SERPL-CCNC: 64 U/L (ref 30–99)
ALT SERPL-CCNC: 11 U/L (ref 2–50)
ANION GAP SERPL CALC-SCNC: 13 MMOL/L (ref 7–16)
APPEARANCE UR: CLEAR
AST SERPL-CCNC: 19 U/L (ref 12–45)
BACTERIA #/AREA URNS HPF: ABNORMAL /HPF
BASOPHILS # BLD AUTO: 1 % (ref 0–1.8)
BASOPHILS # BLD: 0.06 K/UL (ref 0–0.12)
BILIRUB SERPL-MCNC: 1 MG/DL (ref 0.1–1.5)
BILIRUB UR QL STRIP.AUTO: NEGATIVE
BUN SERPL-MCNC: 13 MG/DL (ref 8–22)
CALCIUM SERPL-MCNC: 9.4 MG/DL (ref 8.5–10.5)
CHLORIDE SERPL-SCNC: 107 MMOL/L (ref 96–112)
CK SERPL-CCNC: 60 U/L (ref 0–154)
CO2 SERPL-SCNC: 18 MMOL/L (ref 20–33)
COLOR UR: YELLOW
CREAT SERPL-MCNC: 0.97 MG/DL (ref 0.5–1.4)
EOSINOPHIL # BLD AUTO: 0.1 K/UL (ref 0–0.51)
EOSINOPHIL NFR BLD: 1.7 % (ref 0–6.9)
EPI CELLS #/AREA URNS HPF: ABNORMAL /HPF
ERYTHROCYTE [DISTWIDTH] IN BLOOD BY AUTOMATED COUNT: 48.5 FL (ref 35.9–50)
GFR SERPLBLD CREATININE-BSD FMLA CKD-EPI: 80 ML/MIN/1.73 M 2
GLOBULIN SER CALC-MCNC: 2.6 G/DL (ref 1.9–3.5)
GLUCOSE SERPL-MCNC: 90 MG/DL (ref 65–99)
GLUCOSE UR STRIP.AUTO-MCNC: NEGATIVE MG/DL
HCT VFR BLD AUTO: 45.6 % (ref 42–52)
HGB BLD-MCNC: 15.9 G/DL (ref 14–18)
HYALINE CASTS #/AREA URNS LPF: ABNORMAL /LPF
IMM GRANULOCYTES # BLD AUTO: 0.02 K/UL (ref 0–0.11)
IMM GRANULOCYTES NFR BLD AUTO: 0.3 % (ref 0–0.9)
KETONES UR STRIP.AUTO-MCNC: NEGATIVE MG/DL
LEUKOCYTE ESTERASE UR QL STRIP.AUTO: NEGATIVE
LYMPHOCYTES # BLD AUTO: 1.53 K/UL (ref 1–4.8)
LYMPHOCYTES NFR BLD: 26.7 % (ref 22–41)
MCH RBC QN AUTO: 32.3 PG (ref 27–33)
MCHC RBC AUTO-ENTMCNC: 34.9 G/DL (ref 33.7–35.3)
MCV RBC AUTO: 92.5 FL (ref 81.4–97.8)
MICRO URNS: ABNORMAL
MONOCYTES # BLD AUTO: 0.43 K/UL (ref 0–0.85)
MONOCYTES NFR BLD AUTO: 7.5 % (ref 0–13.4)
NEUTROPHILS # BLD AUTO: 3.59 K/UL (ref 1.82–7.42)
NEUTROPHILS NFR BLD: 62.8 % (ref 44–72)
NITRITE UR QL STRIP.AUTO: NEGATIVE
NRBC # BLD AUTO: 0.02 K/UL
NRBC BLD-RTO: 0.3 /100 WBC
PH UR STRIP.AUTO: 6 [PH] (ref 5–8)
PLATELET # BLD AUTO: 220 K/UL (ref 164–446)
PMV BLD AUTO: 10.4 FL (ref 9–12.9)
POTASSIUM SERPL-SCNC: 4.4 MMOL/L (ref 3.6–5.5)
PROT SERPL-MCNC: 6.8 G/DL (ref 6–8.2)
PROT UR QL STRIP: NEGATIVE MG/DL
RBC # BLD AUTO: 4.93 M/UL (ref 4.7–6.1)
RBC # URNS HPF: ABNORMAL /HPF
RBC UR QL AUTO: ABNORMAL
SODIUM SERPL-SCNC: 138 MMOL/L (ref 135–145)
SP GR UR STRIP.AUTO: 1.02
UROBILINOGEN UR STRIP.AUTO-MCNC: 0.2 MG/DL
WBC # BLD AUTO: 5.7 K/UL (ref 4.8–10.8)
WBC #/AREA URNS HPF: ABNORMAL /HPF

## 2022-11-28 PROCEDURE — 700111 HCHG RX REV CODE 636 W/ 250 OVERRIDE (IP): Performed by: EMERGENCY MEDICINE

## 2022-11-28 PROCEDURE — 72125 CT NECK SPINE W/O DYE: CPT

## 2022-11-28 PROCEDURE — 99285 EMERGENCY DEPT VISIT HI MDM: CPT

## 2022-11-28 PROCEDURE — 96374 THER/PROPH/DIAG INJ IV PUSH: CPT

## 2022-11-28 PROCEDURE — 700102 HCHG RX REV CODE 250 W/ 637 OVERRIDE(OP): Performed by: HOSPITALIST

## 2022-11-28 PROCEDURE — 72131 CT LUMBAR SPINE W/O DYE: CPT

## 2022-11-28 PROCEDURE — 82550 ASSAY OF CK (CPK): CPT

## 2022-11-28 PROCEDURE — 99406 BEHAV CHNG SMOKING 3-10 MIN: CPT | Performed by: HOSPITALIST

## 2022-11-28 PROCEDURE — 70450 CT HEAD/BRAIN W/O DYE: CPT

## 2022-11-28 PROCEDURE — 36415 COLL VENOUS BLD VENIPUNCTURE: CPT

## 2022-11-28 PROCEDURE — 85025 COMPLETE CBC W/AUTO DIFF WBC: CPT

## 2022-11-28 PROCEDURE — 80053 COMPREHEN METABOLIC PANEL: CPT

## 2022-11-28 PROCEDURE — 306637 HCHG MISC ORTHO ITEM RC 0274

## 2022-11-28 PROCEDURE — A9270 NON-COVERED ITEM OR SERVICE: HCPCS | Performed by: HOSPITALIST

## 2022-11-28 PROCEDURE — 96375 TX/PRO/DX INJ NEW DRUG ADDON: CPT

## 2022-11-28 PROCEDURE — 72100 X-RAY EXAM L-S SPINE 2/3 VWS: CPT

## 2022-11-28 PROCEDURE — 73630 X-RAY EXAM OF FOOT: CPT | Mod: LT

## 2022-11-28 PROCEDURE — 72148 MRI LUMBAR SPINE W/O DYE: CPT

## 2022-11-28 PROCEDURE — 81001 URINALYSIS AUTO W/SCOPE: CPT

## 2022-11-28 PROCEDURE — 71101 X-RAY EXAM UNILAT RIBS/CHEST: CPT | Mod: LT

## 2022-11-28 PROCEDURE — 99223 1ST HOSP IP/OBS HIGH 75: CPT | Mod: 25 | Performed by: HOSPITALIST

## 2022-11-28 RX ORDER — OXYCODONE HYDROCHLORIDE 5 MG/1
10 TABLET ORAL
Status: DISCONTINUED | OUTPATIENT
Start: 2022-11-28 | End: 2022-11-29 | Stop reason: HOSPADM

## 2022-11-28 RX ORDER — AMOXICILLIN 250 MG
2 CAPSULE ORAL 2 TIMES DAILY
Status: DISCONTINUED | OUTPATIENT
Start: 2022-11-28 | End: 2022-11-29

## 2022-11-28 RX ORDER — TAMSULOSIN HYDROCHLORIDE 0.4 MG/1
0.8 CAPSULE ORAL DAILY
Status: DISCONTINUED | OUTPATIENT
Start: 2022-11-28 | End: 2022-11-29 | Stop reason: HOSPADM

## 2022-11-28 RX ORDER — LABETALOL HYDROCHLORIDE 5 MG/ML
10 INJECTION, SOLUTION INTRAVENOUS EVERY 4 HOURS PRN
Status: DISCONTINUED | OUTPATIENT
Start: 2022-11-28 | End: 2022-11-29

## 2022-11-28 RX ORDER — MORPHINE SULFATE 4 MG/ML
4 INJECTION INTRAVENOUS ONCE
Status: COMPLETED | OUTPATIENT
Start: 2022-11-28 | End: 2022-11-28

## 2022-11-28 RX ORDER — NICOTINE 21 MG/24HR
14 PATCH, TRANSDERMAL 24 HOURS TRANSDERMAL
Status: DISCONTINUED | OUTPATIENT
Start: 2022-11-29 | End: 2022-11-29 | Stop reason: HOSPADM

## 2022-11-28 RX ORDER — ACETAMINOPHEN 325 MG/1
650 TABLET ORAL EVERY 6 HOURS PRN
Status: DISCONTINUED | OUTPATIENT
Start: 2022-11-28 | End: 2022-11-29 | Stop reason: HOSPADM

## 2022-11-28 RX ORDER — TRAZODONE HYDROCHLORIDE 150 MG/1
150 TABLET ORAL NIGHTLY PRN
Status: DISCONTINUED | OUTPATIENT
Start: 2022-11-28 | End: 2022-11-29 | Stop reason: HOSPADM

## 2022-11-28 RX ORDER — OXYCODONE HYDROCHLORIDE 5 MG/1
5 TABLET ORAL
Status: DISCONTINUED | OUTPATIENT
Start: 2022-11-28 | End: 2022-11-29 | Stop reason: HOSPADM

## 2022-11-28 RX ORDER — POLYETHYLENE GLYCOL 3350 17 G/17G
1 POWDER, FOR SOLUTION ORAL
Status: DISCONTINUED | OUTPATIENT
Start: 2022-11-28 | End: 2022-11-29

## 2022-11-28 RX ORDER — NICOTINE 21 MG/24HR
14 PATCH, TRANSDERMAL 24 HOURS TRANSDERMAL ONCE
Status: DISCONTINUED | OUTPATIENT
Start: 2022-11-28 | End: 2022-11-29 | Stop reason: HOSPADM

## 2022-11-28 RX ORDER — ONDANSETRON 2 MG/ML
4 INJECTION INTRAMUSCULAR; INTRAVENOUS ONCE
Status: COMPLETED | OUTPATIENT
Start: 2022-11-28 | End: 2022-11-28

## 2022-11-28 RX ORDER — ONDANSETRON 2 MG/ML
4 INJECTION INTRAMUSCULAR; INTRAVENOUS EVERY 4 HOURS PRN
Status: DISCONTINUED | OUTPATIENT
Start: 2022-11-28 | End: 2022-11-29 | Stop reason: HOSPADM

## 2022-11-28 RX ORDER — ONDANSETRON 4 MG/1
4 TABLET, ORALLY DISINTEGRATING ORAL EVERY 4 HOURS PRN
Status: DISCONTINUED | OUTPATIENT
Start: 2022-11-28 | End: 2022-11-29 | Stop reason: HOSPADM

## 2022-11-28 RX ORDER — MORPHINE SULFATE 4 MG/ML
4 INJECTION INTRAVENOUS
Status: DISCONTINUED | OUTPATIENT
Start: 2022-11-28 | End: 2022-11-29 | Stop reason: HOSPADM

## 2022-11-28 RX ORDER — BISACODYL 10 MG
10 SUPPOSITORY, RECTAL RECTAL
Status: DISCONTINUED | OUTPATIENT
Start: 2022-11-28 | End: 2022-11-29

## 2022-11-28 RX ADMIN — ACETAMINOPHEN 650 MG: 325 TABLET, FILM COATED ORAL at 19:36

## 2022-11-28 RX ADMIN — TAMSULOSIN HYDROCHLORIDE 0.8 MG: 0.4 CAPSULE ORAL at 20:57

## 2022-11-28 RX ADMIN — MORPHINE SULFATE 4 MG: 4 INJECTION, SOLUTION INTRAMUSCULAR; INTRAVENOUS at 17:32

## 2022-11-28 RX ADMIN — ONDANSETRON 4 MG: 2 INJECTION INTRAMUSCULAR; INTRAVENOUS at 17:31

## 2022-11-28 RX ADMIN — TRAZODONE HYDROCHLORIDE 150 MG: 150 TABLET ORAL at 20:57

## 2022-11-28 RX ADMIN — OXYCODONE 10 MG: 5 TABLET ORAL at 19:37

## 2022-11-28 ASSESSMENT — ENCOUNTER SYMPTOMS
WHEEZING: 0
COUGH: 1
CHILLS: 1
HEADACHES: 0
FEVER: 1
NAUSEA: 0
BACK PAIN: 1
SHORTNESS OF BREATH: 0
DIZZINESS: 1
DIARRHEA: 0
CONSTIPATION: 0
VOMITING: 0

## 2022-11-28 ASSESSMENT — FIBROSIS 4 INDEX: FIB4 SCORE: 0.81

## 2022-11-28 ASSESSMENT — PAIN DESCRIPTION - PAIN TYPE: TYPE: ACUTE PAIN

## 2022-11-28 NOTE — ED TRIAGE NOTES
Chief Complaint   Patient presents with    Back Pain     Patient BIB EMS from home. Patient reports around 7 or 8 pm patient slipped and fell hitting his lower back. Patient reports [facility] saw patient at that time and patient AMA to come into hospital. Today patient reports 8/10 lower back pain. -LOC - Thinners.

## 2022-11-28 NOTE — ED PROVIDER NOTES
"ED Provider Note    CHIEF COMPLAINT  Chief Complaint   Patient presents with    Back Pain     Patient BIB EMS from home. Patient reports around 7 or 8 pm patient slipped and fell hitting his lower back. Patient reports [facility] saw patient at that time and patient AMA to come into hospital. Today patient reports 8/10 lower back pain. -LOC - Thinners.        HPI  June Ventura is a 77 y.o. male here for evaluation after a fall.  Patient states that yesterday, he slipped in the shower, and landed on his low back.  He states that he \"may have hit his head\" but he is not sure.  He complains of some mild headache, midline neck pain, left rib pain, left toe pain, and low back pain.  He has no incontinence of bowel bladder, no urinary retention, and no saddle anesthesia.  Patient has no chest pain, shortness of breath, or abdominal pain.  Nothing seems alleviate exacerbate his symptoms.      ROS  See HPI for further details, o/w negative.     PAST MEDICAL HISTORY   has a past medical history of Chronic back pain.    SOCIAL HISTORY  Social History     Tobacco Use    Smoking status: Every Day     Packs/day: 0.50     Types: Cigarettes    Smokeless tobacco: Never   Vaping Use    Vaping Use: Never used   Substance and Sexual Activity    Alcohol use: Yes     Comment: \"a beer every 3-4 months\"    Drug use: Not Currently    Sexual activity: Not on file       Family History  No bleeding disorders    SURGICAL HISTORY   has a past surgical history that includes other orthopedic surgery (1986) and appendectomy.    CURRENT MEDICATIONS  Home Medications       Reviewed by Mia Yung R.N. (Registered Nurse) on 11/28/22 at 1245  Med List Status: Partial     Medication Last Dose Status   famotidine (PEPCID) 40 MG Tab  Active   HYDROcodone/acetaminophen (NORCO)  MG Tab  Active   pantoprazole (PROTONIX) 40 MG Tablet Delayed Response  Active   tamsulosin (FLOMAX) 0.4 MG capsule  Active   traZODone (DESYREL) 150 MG Tab  Active "   triamcinolone (ARISTOCORT) 0.5 % ointment  Active   Trospium Chloride (SANCTURA PO)  Active                    ALLERGIES  Allergies   Allergen Reactions    Talwin Unspecified     aggression and violence       REVIEW OF SYSTEMS  See HPI for further details. Review of systems as above, otherwise all other systems are negative.     PHYSICAL EXAM  Constitutional: Well developed, well nourished. mild acute distress.  HEENT: Normocephalic, atraumatic. Posterior pharynx clear and moist.  Eyes:  EOMI. Normal sclera.  Neck: Supple, Full range of motion,  left c 2 para spinal tenderness.   Chest/Pulmonary: clear to ausculation. Symmetrical expansion. Tenderness to the left rib margin.  No crepitus   Cardio: Regular rate and rhythm with no murmur.   Abdomen: Soft, nontender. No peritoneal signs. No guarding. No palpable masses.  Back: No CVA tenderness, tenderness to L 1,2 midline, no step offs.  Musculoskeletal: No deformity, no edema, neurovascular intact.  Tenderness to the left toes.  Non tender left ankle.   Neuro: Clear speech, appropriate, cooperative, cranial nerves II-XII grossly intact.  Psych: Normal mood and affect    PROCEDURES     MEDICAL RECORD  I have reviewed patient's medical record and pertinent results are listed.    COURSE & MEDICAL DECISION MAKING  I have reviewed any medical record information, laboratory studies and radiographic results as noted above.    CT-LSPINE W/O PLUS RECONS   Final Result      1.  Ankylosing spondylitis.   2.  Acute L2 compression fracture with superior and inferior endplate infractions. There is also a transversely oriented fracture through the right-sided (otherwise fused) posterior elements at the L2 level.   3.  Cholelithiasis.   4.  Colonic diverticulosis.      [id number] (WITH 1-VIEW CXR) LEFT   Final Result      1.  No appreciable rib fracture.   2.  No acute cardiopulmonary disease.      DX-LUMBAR SPINE-2 OR 3 VIEWS   Final Result      1.  Mild superior endplate  compression fracture involving L2 with 25% height loss and slight kyphotic deformity. There is likely fracture through the posterior bony fusion.      2.  Ankylosing spondylitis.      CT-CSPINE WITHOUT PLUS RECONS   Final Result      1.  No acute fracture or dislocation.   2.  Degenerative and spondylotic changes as outlined above.      CT-HEAD W/O   Final Result      1.  Cerebral atrophy. Age-appropriate.   2.  Otherwise, no acute intracranial hemorrhage or other acute injury pattern evident.         DX-FOOT-COMPLETE 3+ LEFT   Final Result      1.  No evidence of acute fracture or dislocation.      2.  Severe arthropathy of the first MTP joint with possible prior surgical change in that area.      3.  Other areas of multifocal arthropathy.        2:52 PM  I spoke to Dr. Adrian, via voalte.  She would like a ct of the L spine, and if fx, an MRI.      4:41 PM  Pt will be admitted to the hospitalist service for pain control, MRI, and neurosurgery /spine, to see.       FINAL IMPRESSION  1. Compression fracture of L2 vertebra, initial encounter (HCC)            Electronically signed by: Jeremias Urena D.O., 11/28/2022 2:38 PM

## 2022-11-29 ENCOUNTER — PHARMACY VISIT (OUTPATIENT)
Dept: PHARMACY | Facility: MEDICAL CENTER | Age: 77
End: 2022-11-29
Payer: COMMERCIAL

## 2022-11-29 VITALS
OXYGEN SATURATION: 95 % | HEART RATE: 60 BPM | TEMPERATURE: 97.7 F | DIASTOLIC BLOOD PRESSURE: 53 MMHG | SYSTOLIC BLOOD PRESSURE: 98 MMHG | WEIGHT: 165 LBS | RESPIRATION RATE: 18 BRPM | HEIGHT: 68 IN | BODY MASS INDEX: 25.01 KG/M2

## 2022-11-29 LAB
ALBUMIN SERPL BCP-MCNC: 3.9 G/DL (ref 3.2–4.9)
BUN SERPL-MCNC: 14 MG/DL (ref 8–22)
CALCIUM SERPL-MCNC: 9 MG/DL (ref 8.5–10.5)
CHLORIDE SERPL-SCNC: 108 MMOL/L (ref 96–112)
CO2 SERPL-SCNC: 24 MMOL/L (ref 20–33)
CREAT SERPL-MCNC: 1.12 MG/DL (ref 0.5–1.4)
ERYTHROCYTE [DISTWIDTH] IN BLOOD BY AUTOMATED COUNT: 49.4 FL (ref 35.9–50)
GFR SERPLBLD CREATININE-BSD FMLA CKD-EPI: 67 ML/MIN/1.73 M 2
GLUCOSE SERPL-MCNC: 107 MG/DL (ref 65–99)
HCT VFR BLD AUTO: 43 % (ref 42–52)
HGB BLD-MCNC: 14.6 G/DL (ref 14–18)
MCH RBC QN AUTO: 32.1 PG (ref 27–33)
MCHC RBC AUTO-ENTMCNC: 34 G/DL (ref 33.7–35.3)
MCV RBC AUTO: 94.5 FL (ref 81.4–97.8)
PHOSPHATE SERPL-MCNC: 4 MG/DL (ref 2.5–4.5)
PLATELET # BLD AUTO: 225 K/UL (ref 164–446)
PMV BLD AUTO: 9.5 FL (ref 9–12.9)
POTASSIUM SERPL-SCNC: 3.9 MMOL/L (ref 3.6–5.5)
RBC # BLD AUTO: 4.55 M/UL (ref 4.7–6.1)
SODIUM SERPL-SCNC: 140 MMOL/L (ref 135–145)
WBC # BLD AUTO: 5.5 K/UL (ref 4.8–10.8)

## 2022-11-29 PROCEDURE — RXMED WILLOW AMBULATORY MEDICATION CHARGE: Performed by: STUDENT IN AN ORGANIZED HEALTH CARE EDUCATION/TRAINING PROGRAM

## 2022-11-29 PROCEDURE — L0464 TLSO 4MOD SACRO-SCAP PRE: HCPCS

## 2022-11-29 PROCEDURE — 306637 HCHG MISC ORTHO ITEM RC 0274

## 2022-11-29 PROCEDURE — 85027 COMPLETE CBC AUTOMATED: CPT

## 2022-11-29 PROCEDURE — 700102 HCHG RX REV CODE 250 W/ 637 OVERRIDE(OP): Performed by: HOSPITALIST

## 2022-11-29 PROCEDURE — 99239 HOSP IP/OBS DSCHRG MGMT >30: CPT | Performed by: STUDENT IN AN ORGANIZED HEALTH CARE EDUCATION/TRAINING PROGRAM

## 2022-11-29 PROCEDURE — 97535 SELF CARE MNGMENT TRAINING: CPT

## 2022-11-29 PROCEDURE — A9270 NON-COVERED ITEM OR SERVICE: HCPCS | Performed by: HOSPITALIST

## 2022-11-29 PROCEDURE — 97163 PT EVAL HIGH COMPLEX 45 MIN: CPT

## 2022-11-29 PROCEDURE — 80069 RENAL FUNCTION PANEL: CPT

## 2022-11-29 RX ORDER — POLYETHYLENE GLYCOL 3350 17 G/17G
1 POWDER, FOR SOLUTION ORAL
Status: DISCONTINUED | OUTPATIENT
Start: 2022-11-29 | End: 2022-11-29 | Stop reason: HOSPADM

## 2022-11-29 RX ORDER — OXYCODONE HYDROCHLORIDE 5 MG/1
5-10 TABLET ORAL EVERY 4 HOURS PRN
Qty: 30 TABLET | Refills: 0 | Status: SHIPPED | OUTPATIENT
Start: 2022-11-29 | End: 2022-12-04

## 2022-11-29 RX ORDER — AMOXICILLIN 250 MG
2 CAPSULE ORAL 2 TIMES DAILY PRN
Status: DISCONTINUED | OUTPATIENT
Start: 2022-11-29 | End: 2022-11-29 | Stop reason: HOSPADM

## 2022-11-29 RX ORDER — BISACODYL 10 MG
10 SUPPOSITORY, RECTAL RECTAL
Status: DISCONTINUED | OUTPATIENT
Start: 2022-11-29 | End: 2022-11-29 | Stop reason: HOSPADM

## 2022-11-29 RX ADMIN — TAMSULOSIN HYDROCHLORIDE 0.8 MG: 0.4 CAPSULE ORAL at 05:41

## 2022-11-29 RX ADMIN — ACETAMINOPHEN 650 MG: 325 TABLET, FILM COATED ORAL at 01:17

## 2022-11-29 RX ADMIN — OXYCODONE 10 MG: 5 TABLET ORAL at 09:11

## 2022-11-29 RX ADMIN — OXYCODONE 10 MG: 5 TABLET ORAL at 01:17

## 2022-11-29 RX ADMIN — ACETAMINOPHEN 650 MG: 325 TABLET, FILM COATED ORAL at 05:41

## 2022-11-29 RX ADMIN — OXYCODONE 10 MG: 5 TABLET ORAL at 05:41

## 2022-11-29 RX ADMIN — NICOTINE 14 MG: 14 PATCH TRANSDERMAL at 05:41

## 2022-11-29 ASSESSMENT — PAIN DESCRIPTION - PAIN TYPE
TYPE: ACUTE PAIN
TYPE: ACUTE PAIN

## 2022-11-29 ASSESSMENT — COGNITIVE AND FUNCTIONAL STATUS - GENERAL
SUGGESTED CMS G CODE MODIFIER MOBILITY: CK
WALKING IN HOSPITAL ROOM: A LITTLE
TURNING FROM BACK TO SIDE WHILE IN FLAT BAD: A LITTLE
MOVING FROM LYING ON BACK TO SITTING ON SIDE OF FLAT BED: A LITTLE
CLIMB 3 TO 5 STEPS WITH RAILING: A LOT
MOVING TO AND FROM BED TO CHAIR: A LITTLE
STANDING UP FROM CHAIR USING ARMS: A LITTLE
MOBILITY SCORE: 17

## 2022-11-29 ASSESSMENT — GAIT ASSESSMENTS
GAIT LEVEL OF ASSIST: STANDBY ASSIST
DISTANCE (FEET): 80
DEVIATION: BRADYKINETIC;SHUFFLED GAIT;DECREASED BASE OF SUPPORT
ASSISTIVE DEVICE: FRONT WHEEL WALKER

## 2022-11-29 NOTE — THERAPY
Physical Therapy   Initial Evaluation     Patient Name: June Ventura  Age:  77 y.o., Sex:  male  Medical Record #: [id number]  Today's Date: 11/29/2022     Precautions  Precautions: Fall Risk;TLSO (Thoracolumbosacral orthosis);Spinal / Back Precautions     Assessment  Pt is a 77 year old male who slipped and fell while getting out of the shower. Imaging revealed a L2 vertebral body fracture. There is minimal retropulsion into the spinal canal. Pt with TLSO and spinal precautions. Pt has a history of L4-5 lumbar surgery at an outside hospital. Pt has a history of CKD III, B macular degeneration, conductive hearing loss, BPH, and tobacco use. Pt mobilized as detailed below. Pt able to ambulate 80 feet with AD with SBA. Pt able to complete home distances for mobility and does not require to stay for PT when medically cleared. Pt would benefit from skilled physical therapy services if he does remain inhouse, if not pt would benefit from home health services to continue to address mobility and spine safety.     Plan    Recommend Physical Therapy 2 times per week until therapy goals are met for the following treatments:  Bed Mobility, Community Re-integration, Equipment, Gait Training, Manual Therapy, Neuro Re-Education / Balance, Self Care/Home Evaluation, Sensory Integration Techniques, Stair Training, Therapeutic Activities, and Therapeutic Exercises    DC Equipment Recommendations: Front-Wheel Walker  Discharge Recommendations: Recommend home health for continued physical therapy services       Objective       11/29/22 1336   Initial Contact Note    Initial Contact Note Order Received and Verified, Physical Therapy Evaluation in Progress with Full Report to Follow.   Precautions   Precautions Fall Risk;TLSO (Thoracolumbosacral orthosis);Spinal / Back Precautions    Pain 0 - 10 Group   Therapist Pain Assessment During Activity;Nurse Notified;3   Prior Living Situation   Prior Services None   Housing / Facility [facility]    Steps Into Home 0   Steps In Home 0   Bathroom Set up Bathtub / Shower Combination   Equipment Owned None   Lives with - Patient's Self Care Capacity Spouse   Prior Level of Functional Mobility   Bed Mobility Independent   Transfer Status Independent   Ambulation Independent   Distance Ambulation (Feet)   (household)   Assistive Devices Used None   Stairs Independent   History of Falls   History of Falls Yes   Date of Last Fall 11/28/22   Cognition    Level of Consciousness Alert   Passive ROM Lower Body   Passive ROM Lower Body WDL   Active ROM Lower Body    Active ROM Lower Body  WDL   Strength Lower Body   Lower Body Strength  X   Gross Strength Generalized Weakness, Equal Bilaterally   Sensation Lower Body   Lower Extremity Sensation   X   Comments pt has chronic sensation issues in B LE and UE   Lower Body Muscle Tone   Lower Body Muscle Tone  WDL   Balance Assessment   Sitting Balance (Static) Fair +   Sitting Balance (Dynamic) Fair   Standing Balance (Static) Fair   Standing Balance (Dynamic) Fair   Weight Shift Sitting Fair   Weight Shift Standing Fair   Gait Analysis   Gait Level Of Assist Standby Assist   Assistive Device Front Wheel Walker   Distance (Feet) 80   # of Times Distance was Traveled 1   Deviation Bradykinetic;Shuffled Gait;Decreased Base Of Support   Weight Bearing Status no restrictions   Bed Mobility    Supine to Sit Contact Guard Assist   Sit to Supine Contact Guard Assist   Scooting Standby Assist   Functional Mobility   Sit to Stand Standby Assist   Bed, Chair, Wheelchair Transfer Standby Assist   Transfer Method Stand Step   Mobility sup to sit, sts, gait, sit to sup   How much difficulty does the patient currently have...   Turning over in bed (including adjusting bedclothes, sheets and blankets)? 3   Sitting down on and standing up from a chair with arms (e.g., wheelchair, bedside commode, etc.) 3   Moving from lying on back to sitting on the side of the bed? 3   How much help from  another person does the patient currently need...   Moving to and from a bed to a chair (including a wheelchair)? 3   Need to walk in a hospital room? 3   Climbing 3-5 steps with a railing? 2   6 clicks Mobility Score 17   Activity Tolerance   Standing 20 min   Short Term Goals    Short Term Goal # 1 Pt will complete bed mobility with SPV and log roll within 6 visits   Short Term Goal # 2 Pt will ambulate 150 feet with LRAD with SPV to improve functional mobility   Short Term Goal # 3 Pt will complete all functional transfers with SPV within 6 visits to improve functional mobility   Education Group   Education Provided Spine Precautions;Gait Training;Role of Physical Therapist   Spine Precautions Patient Response Patient;Acceptance;Explanation;Verbal Demonstration   Role of Physical Therapist Patient Response Patient;Acceptance;Explanation;Verbal Demonstration   Gait Training Patient Response Patient;Acceptance;Explanation;Verbal Demonstration   Problem List    Problems Pain;Impaired Bed Mobility;Impaired Transfers;Impaired Ambulation;Limited Knowledge of Post-Op Precautions;Impaired Balance;Impaired Coordination;Decreased Activity Tolerance   Anticipated Discharge Equipment and Recommendations   DC Equipment Recommendations Front-Wheel Walker   Discharge Recommendations Recommend home health for continued physical therapy services   Interdisciplinary Plan of Care Collaboration   IDT Collaboration with  Physician;Nursing   Patient Position at End of Therapy In Bed;Tray Table within Reach;Call Light within Reach  (TLSO doffed)   Collaboration Comments PT session   Session Information   Date / Session Number  11/29-1(1/2, 12/5)

## 2022-11-29 NOTE — FACE TO FACE
Face to Face Note  -  Durable Medical Equipment    Krzysztof Soler M.D. - NPI: [id number]  I certify that this patient is under my care and that they have had a durable medical equipment(DME)face to face encounter by myself that meets the physician DME face-to-face encounter requirements with this patient on:    Date of encounter:   Patient:                    MRN:                       YOB: 2022  June Ventura  [id number]  1945     The encounter with the patient was in whole, or in part, for the following medical condition, which is the primary reason for durable medical equipment:  Other - L2 Compression Fracture    I certify that, based on my findings, the following durable medical equipment is medically necessary:  Walkers.        ------------------------------------------------------------------------------------------------------------------    Face to Face Supporting Documentation - Home Health    The encounter with this patient was in whole or in part the primary reason for home health admission.    Date of encounter:   Patient:                    MRN:                       YOB: 2022  June Ventura  [id number]  1945     Home health to see patient for:  Skilled Nursing care for assessment, interventions & education, Physical Therapy evaluation and treatment, and Occupational therapy evaluation and treatment    Skilled need for:  New Onset Medical Diagnosis L2 Compression Fracture    Skilled nursing interventions to include:  Comment: Pain and symptom education    Homebound evidenced status by:  Need the aid of supportive devices such as crutches, canes, wheelchairs or walkers or Needs the assistance of another person in order to leave the home. Leaving home must require a considerable and taxing effort. There must exist a normal inability to leave the home.    Community Physician to provide follow up care: Pcp Unknown     Optional Interventions    Wound  information & treatment:    Home Infusion Therapy orders:    Line/Drain/Airway:    I certify the face to face encounter for this home care referral meets the CMS requirements and the encounter/clinical assessment with the patient was, in whole, or in part, for the medical condition(s) listed above, which is the primary reason for home health care. Based on my clinical findings: the service(s) are medically necessary, support the need for home health care, and the homebound criteria are met.  I certify that this patient has had a face to face encounter by myself.  Krzysztof Soler M.D. - NPI: [id number]    *Debility, frailty and advanced age in the absence of an acute deterioration or exacerbation of a condition do not qualify a patient for home health.

## 2022-11-29 NOTE — PROGRESS NOTES
Resuming care for patient. Bedside report received. Patient is alert and fully oriented, neuro intact except for neuropathy to B/L hands and new tingling since injury to L toes. VSS. Pain medication given for 8/10 pain. Getting pt food, called his wife at bedside. No other needs at this time. Awaiting flomax from pharmacy. RN WCTM.

## 2022-11-29 NOTE — PROGRESS NOTES
Neurosurgery in to speak with pt. Per MD pt can have a diet again. MD Astudillo notified and order obtained. Pain medications administered. No other needs at this time.

## 2022-11-29 NOTE — DISCHARGE PLANNING
Received Choice Form @: 0632  Agency/ Facility Name: [facility]  Referral Sent per Choice Form @: 2586

## 2022-11-29 NOTE — H&P
Hospital Medicine History & Physical Note    Date of Service  11/28/2022    Primary Care Physician  Pcp Unknown    Consultants  Neurosurgeon Dr. Adrian consulted by ERP    Code Status  DNAR/DNI per patient, although he says his wife might not like this    Chief Complaint  Chief Complaint   Patient presents with    Back Pain     Patient BIB EMS from home. Patient reports around 7 or 8 pm patient slipped and fell hitting his lower back. Patient reports [facility] saw patient at that time and patient AMA to come into hospital. Today patient reports 8/10 lower back pain. -LOC - Thinners.         History of Presenting Illness  77 y.o. male who presented 11/28/2022 with lumbar compression fracture.  Patient is currently living at the [facility].  He stepped out of the shower and slipped and fell.  He said it was because it was slippery on the ground and it did not give him a floor towel today.  He denies any loss of consciousness.  He does have history of L4-5 lumbar surgery in the past at [facility].  In the ER, lumbar compression fracture was found on CT.  Neurosurgeon Dr. Adrian was consulted and recommended MRI which has been ordered.  Patient requested that I call his wife to inform her of what is going on and to ask what the new room number is.  I called both numbers in the system, 1 resulted in a busy signal, the other Had a voicemail.    I discussed the plan of care with patient and ER physician .    Review of Systems  Review of Systems   Constitutional:  Positive for chills and fever (off and on).   Eyes:         Blind from mac degen   Respiratory:  Positive for cough. Negative for shortness of breath and wheezing.    Cardiovascular:  Negative for chest pain.   Gastrointestinal:  Negative for constipation, diarrhea, nausea and vomiting.   Genitourinary:  Negative for dysuria.   Musculoskeletal:  Positive for back pain.   Neurological:  Positive for dizziness (If standing up too quickly). Negative for headaches.  "  all other systems reviewed and are negative    Past Medical History   has a past medical history of Chronic back pain.    Surgical History   has a past surgical history that includes other orthopedic surgery () and appendectomy.    Family History  family history is not on file.  Mother was an alcoholic  Father was an alcoholic and  of a heart attack due to hemodialysis  Social History   reports that he has been smoking cigarettes. He has been smoking an average of .5 packs per day. He has never used smokeless tobacco. He reports current alcohol use. He reports that he does not currently use drugs.    Allergies  Allergies   Allergen Reactions    Talwin Unspecified     aggression and violence       Medications  No current facility-administered medications on file prior to encounter.     Current Outpatient Medications on File Prior to Encounter   Medication Sig Dispense Refill    famotidine (PEPCID) 40 MG Tab Take 40 mg by mouth every day.      HYDROcodone/acetaminophen (NORCO)  MG Tab Take 1 Tablet by mouth every 6 hours as needed.      tamsulosin (FLOMAX) 0.4 MG capsule Take 0.8 mg by mouth every day.      pantoprazole (PROTONIX) 40 MG Tablet Delayed Response Take 40 mg by mouth 2 times a day.      triamcinolone (ARISTOCORT) 0.5 % ointment Apply  topically 2 times a day.      traZODone (DESYREL) 150 MG Tab Take 150 mg by mouth at bedtime as needed.      Trospium Chloride (SANCTURA PO) Take 20 mg by mouth every day.         Physical Exam  Weight/BMI: Body mass index is 25.46 kg/m².  BP (!) 170/82   Pulse 75   Temp 36.6 °C (97.8 °F)   Resp 16   Ht 1.715 m (5' 7.5\")   Wt 74.8 kg (165 lb)   SpO2 97%    Vitals:    22 1313 22 1409 22 1451 22 1550   BP: (!) 162/80 (!) 199/87 (!) 174/93 (!) 170/82   Pulse: 76 71 76 75   Resp: 16 16 16 16   Temp:       SpO2: 95% 96% 97% 97%   Weight:       Height:        Oxygen Therapy:  Pulse Oximetry: 97 %, O2 (LPM): 0, O2 Delivery Device: None " - Room Air  Physical Exam  Constitutional:       General: He is not in acute distress.     Appearance: He is well-developed. He is ill-appearing. He is not diaphoretic.      Comments: Disheveled   HENT:      Head: Normocephalic and atraumatic.      Right Ear: External ear normal.      Left Ear: External ear normal.      Mouth/Throat:      Pharynx: No oropharyngeal exudate or posterior oropharyngeal erythema.   Eyes:      General:         Right eye: No discharge.         Left eye: No discharge.      Comments: Essentially blind   Cardiovascular:      Rate and Rhythm: Normal rate.      Heart sounds:     No gallop.   Pulmonary:      Effort: No respiratory distress.      Breath sounds: No wheezing.   Abdominal:      General: There is no distension.      Tenderness: There is no abdominal tenderness. There is no guarding.   Musculoskeletal:         General: Tenderness present.   Skin:     General: Skin is warm.   Neurological:      Mental Status: He is alert and oriented to person, place, and time. Mental status is at baseline.      Motor: Weakness present.      Gait: Gait abnormal.   Psychiatric:         Mood and Affect: Mood normal.         Behavior: Behavior normal.       Laboratory:   Objective   No results found for this or any previous visit (from the past 24 hour(s)).    (click the triangle to expand results)    Imaging:  CT-LSPINE W/O PLUS RECONS   Final Result      1.  Ankylosing spondylitis.   2.  Acute L2 compression fracture with superior and inferior endplate infractions. There is also a transversely oriented fracture through the right-sided (otherwise fused) posterior elements at the L2 level.   3.  Cholelithiasis.   4.  Colonic diverticulosis.      [id number] (WITH 1-VIEW CXR) LEFT   Final Result      1.  No appreciable rib fracture.   2.  No acute cardiopulmonary disease.      DX-LUMBAR SPINE-2 OR 3 VIEWS   Final Result      1.  Mild superior endplate compression fracture involving L2 with 25%  height loss and slight kyphotic deformity. There is likely fracture through the posterior bony fusion.      2.  Ankylosing spondylitis.      CT-CSPINE WITHOUT PLUS RECONS   Final Result      1.  No acute fracture or dislocation.   2.  Degenerative and spondylotic changes as outlined above.      CT-HEAD W/O   Final Result      1.  Cerebral atrophy. Age-appropriate.   2.  Otherwise, no acute intracranial hemorrhage or other acute injury pattern evident.         DX-FOOT-COMPLETE 3+ LEFT   Final Result      1.  No evidence of acute fracture or dislocation.      2.  Severe arthropathy of the first MTP joint with possible prior surgical change in that area.      3.  Other areas of multifocal arthropathy.      MR-LUMBAR SPINE-W/O    (Results Pending)     Assessment/Plan:  I anticipate this patient will require at least two midnights for appropriate medical management, necessitating inpatient admission because of  new compression fracture will she will require further imaging and neurosurgery consult and pain management which cannot be done outpatient and will take greater than 48 hours.    * Lumbar compression fracture, closed, initial encounter (HCC)- (present on admission)  Assessment & Plan  Due to fall  Confirmed on CT for L2 level  Pain control  Has history of L4-5 spine surgery at [facility]  Neurosurgeon Dr. 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 degeneration (HCC)- (present on admission)  Assessment & Plan  Nearly blind  Follow-up with ophthalmology outpatient    Conductive hearing loss, unilateral, left ear, with unrestricted hearing on the contralateral side- (present on admission)  Assessment & Plan  Had tube placed in the past    Fall outside of shower- (present on admission)  Assessment & Plan  Due to slipping and falling   denies loss of consciousness    BPH  (benign prostatic hyperplasia)- (present on admission)  Assessment & Plan  Continue tamsulosin    Tobacco abuse- (present on admission)  Assessment & Plan  Smokes half a pack per day.  Is not sure if he wants to quit smoking because he is already so old but he might try.  Nicotine patch and gum    Spent 4 minutes on tobacco cessation counseling including nicotine patches, gum, and dangers of smoking.      VTE prophylaxis:  SCDs

## 2022-11-29 NOTE — DISCHARGE SUMMARY
Discharge Summary    CHIEF COMPLAINT ON ADMISSION  Chief Complaint   Patient presents with    Back Pain     Patient BIB EMS from home. Patient reports around 7 or 8 pm patient slipped and fell hitting his lower back. Patient reports [facility] saw patient at that time and patient AMA to come into hospital. Today patient reports 8/10 lower back pain. -LOC - Thinners.        Reason for Admission  L2 Compression Fracture    Admission Date  11/28/2022    CODE STATUS  DNAR/DNI    HPI & HOSPITAL COURSE  This is a 77 y.o. homeless male with tobacco use DO and BPH here with L2 compression fracture.    He slipped in the bathtub at the hotel where he and his wife were staying. He developed immediate back pain and difficulty standing. He was BIBA to the ED, where CT/XR trauma series revealed an acute L2 compression fracture and transverse Right posterior element fracture. CT was negative for intracranial hemorrhage nor C-spine injury. Neurosurgery was consulted and recommended MRI, which did not show cord compression. He was recommended for non-operative management with TLSO brace, pain control, and follow up with NSGY in 6 weeks. He was able to stand and ambulate with a walker while wearing a TLSO, for which PT recommended further PT after discharge. He was provided with an order and referral for both HH and outpatient PT/OT, which HH will be unlikely due to his homelessness and [employer] insurance. He was provided with a FWW and oxycodone for pain.    Therefore, he is discharged in fair and stable condition to home with close outpatient follow-up.    The patient met 2-midnight criteria for an inpatient stay at the time of discharge.    Discharge Date  11/29/2020    FOLLOW UP ITEMS POST DISCHARGE  L2 Compression Fracture - TLSO brace, oxycodone PRN for acute pain, outpatient PT/OT, and follow up with neurosurgery    DISCHARGE DIAGNOSES  Principal Problem:    Lumbar compression fracture, closed, initial encounter (HCC) POA:  Yes  Active Problems:    Tobacco abuse POA: Yes    BPH (benign prostatic hyperplasia) POA: Yes    Conductive hearing loss, unilateral, left ear, with unrestricted hearing on the contralateral side POA: Yes      Overview: Formatting of this note might be different from the original.      Added automatically from request for surgery [id number]    Bilateral exudative age-related macular degeneration (HCC) POA: Yes    Stage 3a chronic kidney disease (HCC) POA: Yes    DNR (do not resuscitate) POA: Yes  Resolved Problems:    Fall outside of shower POA: Yes      FOLLOW UP  No future appointments.  Antionette Adrian M.D.  [address]  [facility] [address]  [phone]    Schedule an appointment as soon as possible for a visit in 6 week(s)  after-hospital follow up      MEDICATIONS ON DISCHARGE     Medication List        START taking these medications        Instructions   oxyCODONE immediate-release 5 MG Tabs  Commonly known as: ROXICODONE   Take 1-2 Tablets by mouth every four hours as needed (5 mg for moderate pain (4-6 of 10), 10 mg for severe pain (7-10 of 10)) for up to 5 days.  Dose: 5-10 mg            CONTINUE taking these medications        Instructions   tamsulosin 0.4 MG capsule  Commonly known as: FLOMAX   Take 0.8 mg by mouth every day.  Dose: 0.8 mg     traZODone 150 MG Tabs  Commonly known as: DESYREL   Take 150 mg by mouth at bedtime as needed for Sleep.  Dose: 150 mg            STOP taking these medications      HYDROcodone/acetaminophen  MG Tabs  Commonly known as: NORCO              Allergies  Allergies   Allergen Reactions    Talwin Unspecified     aggression and violence       DIET  Orders Placed This Encounter   Procedures    Diet Order Diet: Regular     Standing Status:   Standing     Number of Occurrences:   1     Order Specific Question:   Diet:     Answer:   Regular [1]       ACTIVITY  As tolerated. - TLSO while out of bed  Weight bearing as  tolerated    CONSULTATIONS  Neurosurgery    PROCEDURES  None    LABORATORY  Lab Results   Component Value Date    SODIUM 140 11/29/2022    POTASSIUM 3.9 11/29/2022    CHLORIDE 108 11/29/2022    CO2 24 11/29/2022    GLUCOSE 107 (H) 11/29/2022    BUN 14 11/29/2022    CREATININE 1.12 11/29/2022        Lab Results   Component Value Date    WBC 5.5 11/29/2022    HEMOGLOBIN 14.6 11/29/2022    HEMATOCRIT 43.0 11/29/2022    PLATELETCT 225 11/29/2022        Total time of the discharge process exceeds 50 minutes.

## 2022-11-29 NOTE — PROGRESS NOTES
Attention order for TLSO back support brace to be fitted to pt. Attempted to fit pt with Deroyal off the shelf size XL with 3.5 inch extension panels brace did not fit pt accurately. [facility] has been called into fit pt with a different TLSO back support brace. If any questions you can contact [facility] at ext # [phone].

## 2022-11-29 NOTE — CONSULTS
Neurosurgery Consult Note    Patient: June Ventura MRN: [id number]    Date of Consultation: 11/28/2022    Reason for Consultation: L2 burst fracture    Referring Physician: Dr. Ayanna DO Emergency Medicine    History of Present Illness:  The patient is a 77 y.o. male presenting after a slip and ground level fall onto his back two days ago. He slipped while transferring in/out of the tub. He developed severe low back pain, called EMS twice and was given medications and a hot pack. He slept well that night then woke up with severe pain. He ultimately presented to the [facility] ED for evaluation.  Neuroimaging was obtained without he was found to have an L2 burst fracture.  Neurosurgery was consulted for evaluation.  He denies any lower extremity weakness, numbness, tingling.  He has no saddle anesthesia or urinary issues. He has a history of L4-5 surgery over 30 years ago with Kumari rods that were removed.    Medications:  Current Facility-Administered Medications   Medication Dose Route Frequency Provider Last Rate Last Admin    traZODone (DESYREL) tablet 150 mg  150 mg Oral HS PRN Sancho Astudillo M.D.   150 mg at 11/28/22 2057    tamsulosin (FLOMAX) capsule 0.8 mg  0.8 mg Oral DAILY Sancho Astudillo M.D.   0.8 mg at 11/28/22 2057    senna-docusate (PERICOLACE or SENOKOT S) 8.6-50 MG per tablet 2 Tablet  2 Tablet Oral BID Sancho Astudillo M.D.        And    polyethylene glycol/lytes (MIRALAX) PACKET 1 Packet  1 Packet Oral QDAY PRN Sancho Astudillo M.D.        And    magnesium hydroxide (MILK OF MAGNESIA) suspension 30 mL  30 mL Oral QDAY PRN Sancho Astudillo M.D.        And    bisacodyl (DULCOLAX) suppository 10 mg  10 mg Rectal QDAY PRN Sancho Astudillo M.D.        acetaminophen (Tylenol) tablet 650 mg  650 mg Oral Q6HRS PRN Sancho Astudillo M.D.   650 mg at 11/28/22 1936    Pharmacy Consult Request ...Pain Management Review 1 Each  1 Each Other PHARMACY TO DOSE Sancho Astudillo M.D.        oxyCODONE immediate-release (ROXICODONE) tablet 5 mg  5 mg  "Oral Q3HRS PRN Sancho Astudillo M.D.        Or    oxyCODONE immediate-release (ROXICODONE) tablet 10 mg  10 mg Oral Q3HRS PRN Sancho Astudillo M.D.   10 mg at 11/28/22 1937    Or    morphine 4 MG/ML injection 4 mg  4 mg Intravenous Q3HRS PRN Sancho Astudillo M.D.        ondansetron (ZOFRAN) syringe/vial injection 4 mg  4 mg Intravenous Q4HRS PRN Sancho Astudillo M.D.        ondansetron (ZOFRAN ODT) dispertab 4 mg  4 mg Oral Q4HRS PRN Sancho Astudillo M.D.        labetalol (NORMODYNE/TRANDATE) injection 10 mg  10 mg Intravenous Q4HRS PRN Sancho Astudillo M.D.        [START ON 11/29/2022] nicotine (NICODERM) 14 MG/24HR 14 mg  14 mg Transdermal Daily-0600 Sancho Astudillo M.D.        And    nicotine polacrilex (NICORETTE) 2 MG piece 2 mg  2 mg Oral Q HOUR PRN Sancho Astudillo M.D.        nicotine (NICODERM) 14 MG/24HR 14 mg  14 mg Transdermal Once Sancho Astudillo M.D.         Current Outpatient Medications   Medication Sig Dispense Refill    HYDROcodone/acetaminophen (NORCO)  MG Tab Take 1 Tablet by mouth every 6 hours as needed.      tamsulosin (FLOMAX) 0.4 MG capsule Take 0.8 mg by mouth every day.      traZODone (DESYREL) 150 MG Tab Take 150 mg by mouth at bedtime as needed for Sleep.         Allergies:  Allergies   Allergen Reactions    Talwin Unspecified     aggression and violence       Past Medical History:  Past Medical History:   Diagnosis Date    Chronic back pain     s/t broken back in 1986; pt states \"2 discs exploded\"       Past Surgical History:  Past Surgical History:   Procedure Laterality Date    OTHER ORTHOPEDIC SURGERY  1986    broken back repair    APPENDECTOMY         Family History:  History reviewed. No pertinent family history.    Social History:  Social History     Socioeconomic History    Marital status:      Spouse name: Not on file    Number of children: Not on file    Years of education: Not on file    Highest education level: Not on file   Occupational History    Not on file   Tobacco Use    Smoking status: Every Day    " " Packs/day: 0.50     Types: Cigarettes    Smokeless tobacco: Never   Vaping Use    Vaping Use: Never used   Substance and Sexual Activity    Alcohol use: Yes     Comment: \"a beer every 3-4 months\"    Drug use: Not Currently    Sexual activity: Not on file   Other Topics Concern    Not on file   Social History Narrative    Not on file     Social Determinants of Health     Financial Resource Strain: Not on file   Food Insecurity: Not on file   Transportation Needs: Not on file   Physical Activity: Not on file   Stress: Not on file   Social Connections: Not on file   Intimate Partner Violence: Not on file   Housing Stability: Not on file       Physical Examination:  Vitals:    11/28/22 1912   BP: (!) 163/82   Pulse: 74   Resp: 16   Temp: 36.8 °C (98.2 °F)   SpO2: 94%     The patient is resting comfortably in bed in no acute distress.  Exquisite tenderness over posterior right paraspinal/iliolumbar region around L4-5    Neurological  Awake, alert, oriented x3. CN II-XII intact.  Motor    RLE  LLE  Iliopsoas     5/5     5/5  Quadriceps     5/5     5/5   Dorsiflexion                   5/5     5/5  Eversion                        5/5     5/5   EHL                               5/5     5/5  Plantarflexion                 5/5     5/5  Hamstrings                     5/5     5/5    Sensation  Sensation to light touch intact in all sensory dermatomes in four extremities.      Reflexes          RLE  LLE  Patella    2/4+     2/4+  Achilles   2/4+     2/4+    Clonus absent bilaterally.    Labs:  Recent Labs     11/28/22  1708   WBC 5.7   RBC 4.93   HEMOGLOBIN 15.9   HEMATOCRIT 45.6   MCV 92.5   MCH 32.3   MCHC 34.9   RDW 48.5   PLATELETCT 220   MPV 10.4     Recent Labs     11/28/22  1708   SODIUM 138   POTASSIUM 4.4   CHLORIDE 107   CO2 18*   GLUCOSE 90   BUN 13   CREATININE 0.97   CALCIUM 9.4               Imaging:    CT lumbar spine without contrast dated 11/28/2022 was independently reviewed in detail, and my interpretation is " as follows.  There is an L2 burst fracture with about 25% loss of height, very mild retropulsion of the posterior fragment to the spinal canal.  There is a very small linear fracture of the right posterior elements/inferior right L2 facet and transverse process.  The remainder of the posterior elements throughout the lumbar spine are autofused, as are the vertebral bodies due to ankylosing spondylitis.    MRI lumbar spine without contrast dated 11/28/2022 was independently reviewed in detail,  and my interpretation is as follows.  Again is seen a mild L2 burst fracture without significant marrow edema.  There is no obvious ligamentous injury.    Assessment and Plan:    June Ventura is a 77 y.o. male presenting with ankylosing spondylitis, L2 burst fracture after a ground-level fall neurologically intact, AJ E.    Recommendations:  - No acute neurosurgical intervention at this time.  -Recommend TLSO brace to be worn for about 6 weeks to ensure proper fusion of the bone.  Recommend the brace to be worn when up and out of bed, can be off when laying in bed  - OK to relax L-spine precautions  -OK to mobilize with PT/OT in a brace  -OK for diet  - I will arrange for 6 week clinic follow-up      Thank you for this consult. Please call with questions.    Antionette Adrian M.D.  [facility]  [address] [place] [address] [place] [address]  [phone]    A total of 30 minutes were spent in the evaluation, examination, coordination of care, review of labs and imaging of this patient. I spent >50% of time face-to-face on patient counseling.

## 2022-11-29 NOTE — ED NOTES
Attempted to ambulate patient with TLSO brace. Patient needed much assistance and was in a lot of pain while walking. Patient is also concerned that he has no home to return to since the motel he was staying in with his wife has evicted him since his fall.  Aruna contacted.

## 2022-11-29 NOTE — ASSESSMENT & PLAN NOTE
Due to fall  Confirmed on CT for L2 level  Pain control  Has history of L4-5 spine surgery at [facility]  Neurosurgeon Dr. Adrian consulted  MRI lumbar pending

## 2022-11-29 NOTE — ASSESSMENT & PLAN NOTE
Smokes half a pack per day.  Is not sure if he wants to quit smoking because he is already so old but he might try.  Nicotine patch and gum    Spent 4 minutes on tobacco cessation counseling including nicotine patches, gum, and dangers of smoking.

## 2022-11-29 NOTE — PROGRESS NOTES
Neurosurgery Note    Called by ED about patient with L2 vertebral body fracture.  Appears to be L2 burst fracture, minimal retropulsion to spinal canal. Linear fracture through right L2 transverse process and inferior facet. Ankylosing spondylitis throughout lumbar spine, osseous fusion through all posterior elements, including L2-3 on left.    Plan for MRI lumbar spine- if no ligamentous injury, plan for TLSO brace for 6 weeks.  L-spine, logroll precautions until then.    Antionette Adrian M.D.

## 2022-11-29 NOTE — DISCHARGE INSTRUCTIONS
Discharge Instructions per Krzysztof Soler M.D.    You were hospitalized for a compression fracture in your 2nd Lumbar vertebra (L2 compression fracture). You were evaluated by a spinal surgeon and determined to not need surgery.    You are being prescribed pain medication. Use only as-needed for severe pain.    You are being referred to home health as well as outpatient PT/OT. This may not be available in [place] with your insurance.    DIET: Regular    ACTIVITY: Wear TLSO brace when out of bed.    DIAGNOSIS: Traumatic L2 Compression Fracture    Return to ER if you faint for any reason, develop bowel or bladder incontinence, become unable to walk, or develop sudden severe chest pain or dyspnea.    Spinal Compression Fracture    A spinal compression fracture is a collapse of the bones that form the spine (vertebrae). With this type of fracture, the vertebrae become pushed (compressed) into a wedge shape. Most compression fractures happen in the middle or lower part of the spine.  What are the causes?  This condition may be caused by:  Thinning and loss of density in the bones (osteoporosis). This is the most common cause.  A fall.  A car or motorcycle accident.  Cancer.  Trauma, such as a heavy, direct hit to the head or back.  What increases the risk?  You are more likely to develop this condition if:  You are 60 years or older.  You have osteoporosis.  You have certain types of cancer, including:  Multiple myeloma.  Lymphoma.  Prostate cancer.  Lung cancer.  Breast cancer.  What are the signs or symptoms?  Symptoms of this condition include:  Severe pain.  Pain that gets worse over time.  Pain that is worse when you stand, walk, sit, or bend.  Sudden pain that is so bad that it is hard for you to move.  Bending or humping of the spine.  Gradual loss of height.  Numbness, tingling, or weakness in the back and legs.  Trouble walking.  Your symptoms will depend on the cause of the fracture and how quickly it  develops.  How is this diagnosed?  This condition may be diagnosed based on symptoms, medical history, and a physical exam. During the physical exam, your health care provider may tap along the length of your spine to check for tenderness. Tests may be done to confirm the diagnosis. They may include:  A bone mineral density test to check for osteoporosis.  Imaging tests, such as a spine X-ray, CT scan, or MRI.  How is this treated?  Treatment for this condition depends on the cause and severity of the condition. Some fractures may heal on their own with supportive care. Treatment may include:  Pain medicine.  Rest.  A back brace.  Physical therapy exercises.  Medicine to strengthen bone.  Calcium and vitamin D supplements.  Fractures that cause the back to become misshapen, cause nerve pain or weakness, or do not respond to other treatment may be treated with surgery. This may include:  Vertebroplasty. Bone cement is injected into the collapsed vertebrae to stabilize them.  Balloon kyphoplasty. The collapsed vertebrae are expanded with a balloon and then bone cement is injected into them.  Spinal fusion. The collapsed vertebrae are connected (fused) to normal vertebrae.  Follow these instructions at home:  Medicines  Take over-the-counter and prescription medicines only as told by your health care provider.  Do not drive or operate heavy machinery while taking prescription pain medicine.  If you are taking prescription pain medicine, take actions to prevent or treat constipation. Your health care provider may recommend that you:  Drink enough fluid to keep your urine pale yellow.  Eat foods that are high in fiber, such as fresh fruits and vegetables, whole grains, and beans.  Limit foods that are high in fat and processed sugars, such as fried or sweet foods.  Take an over-the-counter or prescription medicine for constipation.  If you have a brace:  Wear the brace as told by your health care provider. Remove it only  as told by your health care provider.  Loosen the brace if your fingers or toes tingle, become numb, or turn cold and blue.  Keep the brace clean.  If the brace is not waterproof:  Do not let it get wet.  Cover it with a watertight covering when you take a bath or a shower.  Managing pain, stiffness, and swelling    If directed, apply ice to the injured area:  If you have a removable brace, remove it as told by your health care provider.  Put ice in a plastic bag.  Place a towel between your skin and the bag.  Leave the ice on for 30 minutes every two hours at first. Then apply the ice as needed.  Activity  Rest as told by your health care provider.  Avoid sitting for a long time without moving. Get up to take short walks every 1-2 hours. This is important to improve blood flow and breathing. Ask for help if you feel weak or unsteady.  Return to your normal activities as directed by your health care provider. Ask what activities are safe for you.  Do exercises to improve motion and strength in your back (physical therapy), as recommended by your health care provider.  Exercise regularly as directed by your health care provider.  General instructions    Do not drink alcohol. Alcohol can interfere with your treatment.  Do not use any products that contain nicotine or tobacco, such as cigarettes and e-cigarettes. These can delay bone healing. If you need help quitting, ask your health care provider.  Keep all follow-up visits as told by your health care provider. This is important. It can help to prevent permanent injury, disability, and long-lasting (chronic) pain.  Contact a health care provider if:  You have a fever.  You develop a cough that makes your pain worse.  Your pain medicine is not helping.  Your pain does not get better over time.  You cannot return to your normal activities as planned or expected.  Get help right away if:  Your pain is very bad and it suddenly gets worse.  You are unable to move any body  part (paralysis) that is below the level of your injury.  You have numbness, tingling, or weakness in any body part that is below the level of your injury.  You cannot control your bladder or bowels.  Summary  A spinal compression fracture is a collapse of the bones that form the spine (vertebrae).  With this type of fracture, the vertebrae become pushed (compressed) into a wedge shape.  Your symptoms and treatment will depend on the cause and severity of the fracture and how quickly it develops.  Some fractures may heal on their own with supportive care. Fractures that cause the back to become misshapen, cause nerve pain or weakness, or do not respond to other treatment may be treated with surgery.  This information is not intended to replace advice given to you by your health care provider. Make sure you discuss any questions you have with your health care provider.  Document Released: 12/18/2006 Document Revised: 02/13/2020 Document Reviewed: 01/29/2019  Elsevier Patient Education © 2020 [employer]
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
## 2022-11-30 NOTE — DISCHARGE PLANNING
Pt has been placed for discharge-Pt requesting to speak with .     Pt states he and his wife reside at hotels weekly. They were staying at [employer] when he fell and has since been kicked out of that hotel. Pt states his wife has rented another room but he is unable to reach her by phone and is unsure where she is located.     JILL contacted Pt wife Oumou [phone]. SHe stated she had a room at [facility] but unable to give address. She asked for SW to call her back in an Hour and she could give address. SW called Oumou back and she stated it was on [facility]SW called [facility]  [address] and they are not registered. They transferred me to [facility] in [place] the  stated that Pt wife was there and they were not going to rent her a room and asked that I not sent Pt to that address.     JILL met with Pt and updated him regarding not being able to obtain an address for his wife.   JILL has given Pt bus passes, resources for shelter, picked up and delivered his medications to bedside. Pt advised to go to [facility] and talk wit his wife on the phone-if he can come up with an address he would like to go to  will offer a cab voucher.     JILL has updated RN that Pt can be discharged to [facility] and he can use phone to decide where he would like to stay tonight.     SW will remain available for any continued needs.

## 2022-11-30 NOTE — DISCHARGE PLANNING
JILL called to Lobby to meet with Pt. As he has an address he would like a ride to.   JILL met Pt in the Lobby. He is requesting  to go to [facility] [address] [place] [address].    JILL issued cab voucher [id number] to assist Pt with transport.

## 2023-05-23 ENCOUNTER — PHARMACY VISIT (OUTPATIENT)
Dept: PHARMACY | Facility: MEDICAL CENTER | Age: 78
End: 2023-05-23
Payer: COMMERCIAL

## 2023-05-23 PROCEDURE — RXMED WILLOW AMBULATORY MEDICATION CHARGE: Performed by: NURSE PRACTITIONER

## 2023-06-12 ENCOUNTER — TELEPHONE (OUTPATIENT)
Dept: HEALTH INFORMATION MANAGEMENT | Facility: OTHER | Age: 78
End: 2023-06-12
Payer: COMMERCIAL

## 2023-06-20 ENCOUNTER — APPOINTMENT (OUTPATIENT)
Dept: RADIOLOGY | Facility: MEDICAL CENTER | Age: 78
DRG: 603 | End: 2023-06-20
Attending: STUDENT IN AN ORGANIZED HEALTH CARE EDUCATION/TRAINING PROGRAM
Payer: COMMERCIAL

## 2023-06-20 ENCOUNTER — HOSPITAL ENCOUNTER (INPATIENT)
Facility: MEDICAL CENTER | Age: 78
LOS: 2 days | DRG: 603 | End: 2023-06-23
Attending: EMERGENCY MEDICINE | Admitting: INTERNAL MEDICINE
Payer: COMMERCIAL

## 2023-06-20 DIAGNOSIS — L03.115 CELLULITIS OF RIGHT LOWER EXTREMITY: ICD-10-CM

## 2023-06-20 PROBLEM — L03.031 CELLULITIS OF GREAT TOE OF RIGHT FOOT: Status: ACTIVE | Noted: 2023-06-20

## 2023-06-20 PROBLEM — Z59.00 HOMELESS: Status: ACTIVE | Noted: 2023-06-20

## 2023-06-20 LAB
ALBUMIN SERPL BCP-MCNC: 3.8 G/DL (ref 3.2–4.9)
ALBUMIN/GLOB SERPL: 1.5 G/DL
ALP SERPL-CCNC: 78 U/L (ref 30–99)
ALT SERPL-CCNC: 10 U/L (ref 2–50)
AMPHET UR QL SCN: POSITIVE
ANION GAP SERPL CALC-SCNC: 11 MMOL/L (ref 7–16)
AST SERPL-CCNC: 11 U/L (ref 12–45)
BARBITURATES UR QL SCN: NEGATIVE
BASOPHILS # BLD AUTO: 0.7 % (ref 0–1.8)
BASOPHILS # BLD: 0.04 K/UL (ref 0–0.12)
BENZODIAZ UR QL SCN: NEGATIVE
BILIRUB SERPL-MCNC: 0.8 MG/DL (ref 0.1–1.5)
BUN SERPL-MCNC: 19 MG/DL (ref 8–22)
BZE UR QL SCN: NEGATIVE
CALCIUM ALBUM COR SERPL-MCNC: 9.2 MG/DL (ref 8.5–10.5)
CALCIUM SERPL-MCNC: 9 MG/DL (ref 8.5–10.5)
CANNABINOIDS UR QL SCN: NEGATIVE
CHLORIDE SERPL-SCNC: 107 MMOL/L (ref 96–112)
CO2 SERPL-SCNC: 23 MMOL/L (ref 20–33)
CREAT SERPL-MCNC: 1 MG/DL (ref 0.5–1.4)
EOSINOPHIL # BLD AUTO: 0.07 K/UL (ref 0–0.51)
EOSINOPHIL NFR BLD: 1.2 % (ref 0–6.9)
ERYTHROCYTE [DISTWIDTH] IN BLOOD BY AUTOMATED COUNT: 48 FL (ref 35.9–50)
ETHANOL BLD-MCNC: <10.1 MG/DL
FENTANYL UR QL: NEGATIVE
GFR SERPLBLD CREATININE-BSD FMLA CKD-EPI: 77 ML/MIN/1.73 M 2
GLOBULIN SER CALC-MCNC: 2.6 G/DL (ref 1.9–3.5)
GLUCOSE SERPL-MCNC: 93 MG/DL (ref 65–99)
HCT VFR BLD AUTO: 43.6 % (ref 42–52)
HGB BLD-MCNC: 15.3 G/DL (ref 14–18)
IMM GRANULOCYTES # BLD AUTO: 0.01 K/UL (ref 0–0.11)
IMM GRANULOCYTES NFR BLD AUTO: 0.2 % (ref 0–0.9)
LACTATE SERPL-SCNC: 1 MMOL/L (ref 0.5–2)
LACTATE SERPL-SCNC: 1.1 MMOL/L (ref 0.5–2)
LACTATE SERPL-SCNC: 1.1 MMOL/L (ref 0.5–2)
LYMPHOCYTES # BLD AUTO: 1.1 K/UL (ref 1–4.8)
LYMPHOCYTES NFR BLD: 18.6 % (ref 22–41)
MAGNESIUM SERPL-MCNC: 2.3 MG/DL (ref 1.5–2.5)
MCH RBC QN AUTO: 33.1 PG (ref 27–33)
MCHC RBC AUTO-ENTMCNC: 35.1 G/DL (ref 32.3–36.5)
MCV RBC AUTO: 94.4 FL (ref 81.4–97.8)
METHADONE UR QL SCN: NEGATIVE
MONOCYTES # BLD AUTO: 0.71 K/UL (ref 0–0.85)
MONOCYTES NFR BLD AUTO: 12 % (ref 0–13.4)
NEUTROPHILS # BLD AUTO: 3.99 K/UL (ref 1.82–7.42)
NEUTROPHILS NFR BLD: 67.3 % (ref 44–72)
NRBC # BLD AUTO: 0 K/UL
NRBC BLD-RTO: 0 /100 WBC (ref 0–0.2)
OPIATES UR QL SCN: NEGATIVE
OXYCODONE UR QL SCN: NEGATIVE
PCP UR QL SCN: NEGATIVE
PLATELET # BLD AUTO: 236 K/UL (ref 164–446)
PMV BLD AUTO: 9.1 FL (ref 9–12.9)
POTASSIUM SERPL-SCNC: 4.3 MMOL/L (ref 3.6–5.5)
PROCALCITONIN SERPL-MCNC: 0.1 NG/ML
PROPOXYPH UR QL SCN: NEGATIVE
PROT SERPL-MCNC: 6.4 G/DL (ref 6–8.2)
RBC # BLD AUTO: 4.62 M/UL (ref 4.7–6.1)
SODIUM SERPL-SCNC: 141 MMOL/L (ref 135–145)
WBC # BLD AUTO: 5.9 K/UL (ref 4.8–10.8)

## 2023-06-20 PROCEDURE — 96365 THER/PROPH/DIAG IV INF INIT: CPT

## 2023-06-20 PROCEDURE — 700105 HCHG RX REV CODE 258: Mod: UD | Performed by: STUDENT IN AN ORGANIZED HEALTH CARE EDUCATION/TRAINING PROGRAM

## 2023-06-20 PROCEDURE — G0378 HOSPITAL OBSERVATION PER HR: HCPCS

## 2023-06-20 PROCEDURE — 73630 X-RAY EXAM OF FOOT: CPT | Mod: RT

## 2023-06-20 PROCEDURE — 83605 ASSAY OF LACTIC ACID: CPT | Mod: 91

## 2023-06-20 PROCEDURE — 96375 TX/PRO/DX INJ NEW DRUG ADDON: CPT

## 2023-06-20 PROCEDURE — 80053 COMPREHEN METABOLIC PANEL: CPT

## 2023-06-20 PROCEDURE — 80307 DRUG TEST PRSMV CHEM ANLYZR: CPT

## 2023-06-20 PROCEDURE — 84145 PROCALCITONIN (PCT): CPT

## 2023-06-20 PROCEDURE — 700111 HCHG RX REV CODE 636 W/ 250 OVERRIDE (IP): Performed by: EMERGENCY MEDICINE

## 2023-06-20 PROCEDURE — 87040 BLOOD CULTURE FOR BACTERIA: CPT

## 2023-06-20 PROCEDURE — 51798 US URINE CAPACITY MEASURE: CPT

## 2023-06-20 PROCEDURE — 700102 HCHG RX REV CODE 250 W/ 637 OVERRIDE(OP): Mod: UD | Performed by: STUDENT IN AN ORGANIZED HEALTH CARE EDUCATION/TRAINING PROGRAM

## 2023-06-20 PROCEDURE — 96367 TX/PROPH/DG ADDL SEQ IV INF: CPT

## 2023-06-20 PROCEDURE — 82077 ASSAY SPEC XCP UR&BREATH IA: CPT

## 2023-06-20 PROCEDURE — 700111 HCHG RX REV CODE 636 W/ 250 OVERRIDE (IP): Mod: UD | Performed by: STUDENT IN AN ORGANIZED HEALTH CARE EDUCATION/TRAINING PROGRAM

## 2023-06-20 PROCEDURE — 87641 MR-STAPH DNA AMP PROBE: CPT

## 2023-06-20 PROCEDURE — 36415 COLL VENOUS BLD VENIPUNCTURE: CPT

## 2023-06-20 PROCEDURE — A9270 NON-COVERED ITEM OR SERVICE: HCPCS | Mod: UD | Performed by: STUDENT IN AN ORGANIZED HEALTH CARE EDUCATION/TRAINING PROGRAM

## 2023-06-20 PROCEDURE — 99285 EMERGENCY DEPT VISIT HI MDM: CPT

## 2023-06-20 PROCEDURE — 83735 ASSAY OF MAGNESIUM: CPT

## 2023-06-20 PROCEDURE — 85025 COMPLETE CBC W/AUTO DIFF WBC: CPT

## 2023-06-20 PROCEDURE — 99222 1ST HOSP IP/OBS MODERATE 55: CPT | Performed by: STUDENT IN AN ORGANIZED HEALTH CARE EDUCATION/TRAINING PROGRAM

## 2023-06-20 PROCEDURE — 96374 THER/PROPH/DIAG INJ IV PUSH: CPT

## 2023-06-20 RX ORDER — POLYETHYLENE GLYCOL 3350 17 G/17G
1 POWDER, FOR SOLUTION ORAL
Status: DISCONTINUED | OUTPATIENT
Start: 2023-06-20 | End: 2023-06-23 | Stop reason: HOSPADM

## 2023-06-20 RX ORDER — ONDANSETRON 4 MG/1
4 TABLET, ORALLY DISINTEGRATING ORAL EVERY 4 HOURS PRN
Status: DISCONTINUED | OUTPATIENT
Start: 2023-06-20 | End: 2023-06-23 | Stop reason: HOSPADM

## 2023-06-20 RX ORDER — CEFAZOLIN 2 G/1
2 INJECTION, POWDER, FOR SOLUTION INTRAMUSCULAR; INTRAVENOUS ONCE
Status: COMPLETED | OUTPATIENT
Start: 2023-06-20 | End: 2023-06-20

## 2023-06-20 RX ORDER — CELECOXIB 100 MG/1
100 CAPSULE ORAL DAILY
COMMUNITY

## 2023-06-20 RX ORDER — ACETAMINOPHEN 500 MG
1000 TABLET ORAL 3 TIMES DAILY
Status: DISCONTINUED | OUTPATIENT
Start: 2023-06-20 | End: 2023-06-23 | Stop reason: HOSPADM

## 2023-06-20 RX ORDER — ACETAMINOPHEN 500 MG
500-1000 TABLET ORAL EVERY 6 HOURS PRN
COMMUNITY

## 2023-06-20 RX ORDER — HYDRALAZINE HYDROCHLORIDE 20 MG/ML
10 INJECTION INTRAMUSCULAR; INTRAVENOUS EVERY 4 HOURS PRN
Status: DISCONTINUED | OUTPATIENT
Start: 2023-06-20 | End: 2023-06-23 | Stop reason: HOSPADM

## 2023-06-20 RX ORDER — CELECOXIB 100 MG/1
100 CAPSULE ORAL DAILY
Status: DISCONTINUED | OUTPATIENT
Start: 2023-06-21 | End: 2023-06-23 | Stop reason: HOSPADM

## 2023-06-20 RX ORDER — AMOXICILLIN 250 MG
2 CAPSULE ORAL 2 TIMES DAILY
Status: DISCONTINUED | OUTPATIENT
Start: 2023-06-20 | End: 2023-06-23 | Stop reason: HOSPADM

## 2023-06-20 RX ORDER — GUAIFENESIN/DEXTROMETHORPHAN 100-10MG/5
10 SYRUP ORAL EVERY 6 HOURS PRN
Status: DISCONTINUED | OUTPATIENT
Start: 2023-06-20 | End: 2023-06-23 | Stop reason: HOSPADM

## 2023-06-20 RX ORDER — CHOLECALCIFEROL (VITAMIN D3) 125 MCG
5 CAPSULE ORAL NIGHTLY
Status: DISCONTINUED | OUTPATIENT
Start: 2023-06-20 | End: 2023-06-23 | Stop reason: HOSPADM

## 2023-06-20 RX ORDER — ONDANSETRON 2 MG/ML
4 INJECTION INTRAMUSCULAR; INTRAVENOUS EVERY 4 HOURS PRN
Status: DISCONTINUED | OUTPATIENT
Start: 2023-06-20 | End: 2023-06-23 | Stop reason: HOSPADM

## 2023-06-20 RX ORDER — OXYCODONE HYDROCHLORIDE 5 MG/1
5 TABLET ORAL EVERY 8 HOURS PRN
Status: DISCONTINUED | OUTPATIENT
Start: 2023-06-20 | End: 2023-06-23 | Stop reason: HOSPADM

## 2023-06-20 RX ORDER — BISACODYL 10 MG
10 SUPPOSITORY, RECTAL RECTAL
Status: DISCONTINUED | OUTPATIENT
Start: 2023-06-20 | End: 2023-06-23 | Stop reason: HOSPADM

## 2023-06-20 RX ORDER — IBUPROFEN 200 MG
600 TABLET ORAL EVERY 6 HOURS PRN
COMMUNITY

## 2023-06-20 RX ADMIN — ACETAMINOPHEN 1000 MG: 500 TABLET, FILM COATED ORAL at 21:51

## 2023-06-20 RX ADMIN — AMPICILLIN AND SULBACTAM 3 G: 1; 2 INJECTION, POWDER, FOR SOLUTION INTRAMUSCULAR; INTRAVENOUS at 18:50

## 2023-06-20 RX ADMIN — CEFAZOLIN 2 G: 2 INJECTION, POWDER, FOR SOLUTION INTRAMUSCULAR; INTRAVENOUS at 21:56

## 2023-06-20 RX ADMIN — Medication 5 MG: at 21:51

## 2023-06-20 RX ADMIN — RIVAROXABAN 10 MG: 10 TABLET, FILM COATED ORAL at 18:51

## 2023-06-20 RX ADMIN — ACETAMINOPHEN 1000 MG: 500 TABLET, FILM COATED ORAL at 16:43

## 2023-06-20 RX ADMIN — OXYCODONE HYDROCHLORIDE 5 MG: 5 TABLET ORAL at 21:51

## 2023-06-20 RX ADMIN — CEFAZOLIN 2 G: 2 INJECTION, POWDER, FOR SOLUTION INTRAMUSCULAR; INTRAVENOUS at 15:28

## 2023-06-20 ASSESSMENT — PATIENT HEALTH QUESTIONNAIRE - PHQ9
SUM OF ALL RESPONSES TO PHQ9 QUESTIONS 1 AND 2: 0
1. LITTLE INTEREST OR PLEASURE IN DOING THINGS: NOT AT ALL
2. FEELING DOWN, DEPRESSED, IRRITABLE, OR HOPELESS: NOT AT ALL
2. FEELING DOWN, DEPRESSED, IRRITABLE, OR HOPELESS: NOT AT ALL
1. LITTLE INTEREST OR PLEASURE IN DOING THINGS: NOT AT ALL
SUM OF ALL RESPONSES TO PHQ9 QUESTIONS 1 AND 2: 0

## 2023-06-20 ASSESSMENT — LIFESTYLE VARIABLES
EVER FELT BAD OR GUILTY ABOUT YOUR DRINKING: NO
HOW MANY TIMES IN THE PAST YEAR HAVE YOU HAD 5 OR MORE DRINKS IN A DAY: 0
TOTAL SCORE: 0
ON A TYPICAL DAY WHEN YOU DRINK ALCOHOL HOW MANY DRINKS DO YOU HAVE: 0
HAVE PEOPLE ANNOYED YOU BY CRITICIZING YOUR DRINKING: NO
ALCOHOL_USE: YES
CONSUMPTION TOTAL: NEGATIVE
TOTAL SCORE: 0
EVER HAD A DRINK FIRST THING IN THE MORNING TO STEADY YOUR NERVES TO GET RID OF A HANGOVER: NO
TOTAL SCORE: 0
HAVE YOU EVER FELT YOU SHOULD CUT DOWN ON YOUR DRINKING: NO
AVERAGE NUMBER OF DAYS PER WEEK YOU HAVE A DRINK CONTAINING ALCOHOL: 0

## 2023-06-20 ASSESSMENT — ENCOUNTER SYMPTOMS
RESPIRATORY NEGATIVE: 1
WEAKNESS: 1
PSYCHIATRIC NEGATIVE: 1
EYES NEGATIVE: 1
CARDIOVASCULAR NEGATIVE: 1
GASTROINTESTINAL NEGATIVE: 1

## 2023-06-20 ASSESSMENT — FIBROSIS 4 INDEX
FIB4 SCORE: 1.96
FIB4 SCORE: 1.13
FIB4 SCORE: 1.13

## 2023-06-20 ASSESSMENT — PAIN DESCRIPTION - PAIN TYPE
TYPE: CHRONIC PAIN

## 2023-06-20 NOTE — ED TRIAGE NOTES
"Chief Complaint   Patient presents with    Foot Swelling     Medical staff at [facility] noticed pt's R foot/leg swelling today. Pt has a wound on the bottom of his R foot. Unknown how long it's been there. Pt also c/o R ankle pain after \"twisting\" it yesterday.    Leg Swelling     R leg swelling. CMS intact.     /60   Pulse 90   Temp 36.4 °C (97.5 °F) (Temporal)   Resp 18   Ht 1.702 m (5' 7\")   Wt 81.6 kg (180 lb)   SpO2 97%   BMI 28.19 kg/m²     " Pain medication dropped on floor, (Oxycodone 10mg), wasted with second RN. New medication removed from [facility] and administered to patient at this time.

## 2023-06-20 NOTE — H&P
"Hospital Medicine History & Physical Note    Date of Service  6/20/2023    Primary Care Physician  Pcp Unknown    Consultants  None    Code Status  Full Code    Chief Complaint  Chief Complaint   Patient presents with    Foot Swelling     Medical staff at [facility] noticed pt's R foot/leg swelling today. Pt has a wound on the bottom of his R foot. Unknown how long it's been there. Pt also c/o R ankle pain after \"twisting\" it yesterday.    Leg Swelling     R leg swelling. CMS intact.       History of Presenting Illness  77-year-old homeless male with a past medical history of BPH, blindness and chronic right lower extremity swelling presents emergency department on 6/20/2023 with acutely worsening chronic right lower extremity inflammation.  Patient reports that he had a ground-level fall 4 days ago and has been having worsening 10 out of 10, sharp right leg pain.  Pain worsens with ambulation or use of extremity and improves with ibuprofen.  He associates symptoms with subjective fevers and chills, no sweats or loss of consciousness.    In the emergency department, stable vitals and patient saturating well room air.  CBC without leukocytosis or anemia.  Chemistry without gross normalities.  Lactic acid low.  Blood samples were collected for culture and patient was started on Ancef antibiotic regimen.  Patient was admitted for right lower extremity cellulitis requiring IV antibiotics, further work-up and management.    I discussed the plan of care with patient and bedside RN.    Review of Systems  Review of Systems   Constitutional:  Positive for malaise/fatigue.   HENT: Negative.     Eyes: Negative.    Respiratory: Negative.     Cardiovascular: Negative.    Gastrointestinal: Negative.    Genitourinary: Negative.    Musculoskeletal:  Positive for joint pain.   Skin: Negative.    Neurological:  Positive for weakness.   Endo/Heme/Allergies: Negative.    Psychiatric/Behavioral: Negative.         Past Medical " History   has a past medical history of Chronic back pain.    Surgical History   has a past surgical history that includes other orthopedic surgery (1986) and appendectomy.     Family History  Family history reviewed with patient. There is no family history that is pertinent to the chief complaint.     Social History   reports that he has been smoking cigarettes. He has been smoking an average of .5 packs per day. He has never used smokeless tobacco. He reports current alcohol use. He reports that he does not currently use drugs.    Allergies  Allergies   Allergen Reactions    Talwin Unspecified     aggression and violence       Medications  Prior to Admission Medications   Prescriptions Last Dose Informant Patient Reported? Taking?   HYDROcodone/acetaminophen (NORCO)  MG Tab ABOUT 1 WEEK AGO at OUT Patient No No   Sig: Take 1 tablet by mouth 3 times daily as needed   acetaminophen (TYLENOL) 500 MG Tab 6/20/2023 at AM Patient Yes Yes   Sig: Take 500-1,000 mg by mouth every 6 hours as needed. Indications: Pain   celecoxib (CELEBREX) 100 MG Cap 6/20/2023 at AM Patient Yes Yes   Sig: Take 100 mg by mouth every day.   ibuprofen (MOTRIN) 200 MG Tab 6/20/2023 at AM Patient Yes Yes   Sig: Take 600 mg by mouth every 6 hours as needed. Indications: Pain      Facility-Administered Medications: None       Physical Exam  Temp:  [36.4 °C (97.5 °F)] 36.4 °C (97.5 °F)  Pulse:  [90] 90  Resp:  [18] 18  BP: (120)/(60) 120/60  SpO2:  [97 %] 97 %  Blood Pressure : 120/60   Temperature: 36.4 °C (97.5 °F)   Pulse: 90   Respiration: 18   Pulse Oximetry: 97 %       Physical Exam  Constitutional:       General: He is not in acute distress.     Appearance: Normal appearance.   HENT:      Head: Normocephalic and atraumatic.      Nose: Nose normal. No congestion.      Mouth/Throat:      Mouth: Mucous membranes are moist.   Eyes:      Extraocular Movements: Extraocular movements intact.      Pupils: Pupils are equal, round, and reactive  to light.   Cardiovascular:      Rate and Rhythm: Normal rate and regular rhythm.      Pulses: Normal pulses.      Heart sounds: Normal heart sounds.   Pulmonary:      Effort: Pulmonary effort is normal.      Breath sounds: Normal breath sounds.   Abdominal:      General: Bowel sounds are normal.      Palpations: Abdomen is soft.      Tenderness: There is no abdominal tenderness.   Musculoskeletal:         General: No swelling. Normal range of motion.      Cervical back: Normal range of motion and neck supple.      Right lower leg: Edema present.   Skin:     General: Skin is warm.      Coloration: Skin is not jaundiced.   Neurological:      General: No focal deficit present.      Mental Status: He is alert and oriented to person, place, and time. Mental status is at baseline.      Cranial Nerves: No cranial nerve deficit.   Psychiatric:         Mood and Affect: Mood normal.         Behavior: Behavior normal.         Thought Content: Thought content normal.         Judgment: Judgment normal.         Laboratory:  Recent Labs     06/20/23  1423   WBC 5.9   RBC 4.62*   HEMOGLOBIN 15.3   HEMATOCRIT 43.6   MCV 94.4   MCH 33.1*   MCHC 35.1   RDW 48.0   PLATELETCT 236   MPV 9.1     Recent Labs     06/20/23  1423   SODIUM 141   POTASSIUM 4.3   CHLORIDE 107   CO2 23   GLUCOSE 93   BUN 19   CREATININE 1.00   CALCIUM 9.0     Recent Labs     06/20/23  1423   ALTSGPT 10   ASTSGOT 11*   ALKPHOSPHAT 78   TBILIRUBIN 0.8   GLUCOSE 93         No results for input(s): NTPROBNP in the last 72 hours.      No results for input(s): TROPONINT in the last 72 hours.    Imaging:  DX-FOOT-COMPLETE 3+ LEFT    (Results Pending)     Assessment/Plan:  Justification for Admission Status  I anticipate this patient is appropriate for observation status at this time because of below    * Cellulitis of great toe of right foot- (present on admission)  Assessment & Plan  Acute on chronic  Worsening swelling  Stable vitals and labs  Received Ancef to  ED  Switch to IV Unasyn  Pending MRSA lor  Limb preservation  Labs in a.m.    BPH (benign prostatic hyperplasia)- (present on admission)  Assessment & Plan  History of  Postvoid residual    Homeless- (present on admission)  Assessment & Plan  Poor follow-up  Case management        VTE prophylaxis: enoxaparin ppx

## 2023-06-20 NOTE — ED PROVIDER NOTES
"ED Provider Note    CHIEF COMPLAINT  Chief Complaint   Patient presents with    Foot Swelling     Medical staff at [facility] noticed pt's R foot/leg swelling today. Pt has a wound on the bottom of his R foot. Unknown how long it's been there. Pt also c/o R ankle pain after \"twisting\" it yesterday.    Leg Swelling     R leg swelling. CMS intact.       EXTERNAL RECORDS REVIEWED  Outpatient Notes he had colonoscopy, bone density as well    HPI/ROS  LIMITATION TO HISTORY   Select: : None      June Ventura is a 77 y.o. male who presents complaining of foot pain.  Staff at his shelter noticed increasing swelling and swelling.  The patient has had a spot on his foot for a while, and it is gotten increasing more painful and swollen.  He fell yesterday because it gave out because it hurts so much.  He denies any new injury here.  He denies any posterior calf pain although it does hurt upfront where it is red.  Denies any fever or chills.  No chest pain or shortness of breath.  There is no other complaint.    PAST MEDICAL HISTORY   has a past medical history of Chronic back pain.  Blindness    SURGICAL HISTORY   has a past surgical history that includes other orthopedic surgery (1986) and appendectomy.    FAMILY HISTORY  History reviewed. No pertinent family history.    SOCIAL HISTORY  Social History     Tobacco Use    Smoking status: Every Day     Packs/day: 0.50     Types: Cigarettes    Smokeless tobacco: Never   Vaping Use    Vaping Use: Never used   Substance and Sexual Activity    Alcohol use: Yes     Comment: \"a beer every 3-4 months\"    Drug use: Not Currently    Sexual activity: Not on file       CURRENT MEDICATIONS  Home Medications       Reviewed by Flako Carpio (Pharmacy Tech) on 06/20/23 at 1527  Med List Status: Complete     Medication Last Dose Status   acetaminophen (TYLENOL) 500 MG Tab 6/20/2023 Active   celecoxib (CELEBREX) 100 MG Cap 6/20/2023 Active   HYDROcodone/acetaminophen (NORCO)  MG " "Tab ABOUT 1 WEEK AGO Active   ibuprofen (MOTRIN) 200 MG Tab 6/20/2023 Active                    ALLERGIES  Allergies   Allergen Reactions    Talwin Unspecified     aggression and violence       PHYSICAL EXAM  VITAL SIGNS: /60   Pulse 90   Temp 36.4 °C (97.5 °F) (Temporal)   Resp 18   Ht 1.702 m (5' 7\")   Wt 81.6 kg (180 lb)   SpO2 97%   BMI 28.19 kg/m²    Constitutional: Well appearing patient in no acute distress.  Disheveled but not toxic  HENT: Head is without trauma.  Oropharynx is clear.  Mucous membranes are moist.  Eyes: Sclerae are nonicteric  Neck: Supple with grossly normal range of motion.  Cardiovascular: Heart is regular rate and rhythm without murmur rub or gallop.  Peripheral pulses are intact and symmetric throughout.  Thorax & Lungs: Breathing easily.  Good air movement.  There is no wheeze, rhonchi or rales.  Abdomen: Bowel sounds normal, soft, non-distended, nontender, no mass nor pulsatile mass. I do not appreciate hepatosplenomegaly.  Skin: No apparent rash.  I do not see petechiae or purpura.  See below  Extremities: Patient has poor foot hygiene.  On the right plantar aspect, he has some violaceous mottling consistent with infection, with erythema on the dorsum as well as the plantar aspect this extends up to the anterior leg.  There is no posterior tenderness.  There is no cords.  He has bilateral edema, symmetric.  Neurologic: Alert. Moving all extremities.  Intact sensation and strength throughout.   Psychiatric: Normal for situation      DIAGNOSTIC STUDIES / PROCEDURES    LABS  Labs Reviewed   CBC WITH DIFFERENTIAL - Abnormal; Notable for the following components:       Result Value    RBC 4.62 (*)     MCH 33.1 (*)     Lymphocytes 18.60 (*)     All other components within normal limits   COMP METABOLIC PANEL - Abnormal; Notable for the following components:    AST(SGOT) 11 (*)     All other components within normal limits   LACTIC ACID   CORRECTED CALCIUM   ESTIMATED GFR " "  LACTIC ACID   LACTIC ACID   BLOOD CULTURE   BLOOD CULTURE    Narrative:     Per Hospital Policy: Only change Specimen Src: to \"Line\" if  specified by physician order.         RADIOLOGY      COURSE & MEDICAL DECISION MAKING    ED Observation Status? No; Patient does not meet criteria for ED Observation.     INITIAL ASSESSMENT, COURSE AND PLAN  Care Narrative: This patient presents with leg pain and swelling.  It looks like he has a cellulitis of his foot and leg.  I do not think he will be a good candidate for outpatient management given his present living situation.  He will need to rest this and elevate his leg.  He needs antibiotics, and I have initiated Ancef per guideline for cellulitis.  He was cultured out prior to this, and I did obtain a lactic acid which was normal.  Also there is no evidence of leukocytosis or shift.  At this point, I did not image him, as he has no bony tenderness.  This seems to be all within the skin itself.  I do not suspect deep venous thrombosis based upon his exam.  I have a low suspicion for osteomyelitis/deep infection but that could be considered depending on his response to antibiotics.  I discussed the patient's case with the nurse practitioner, however, she has wondered whether he would better served as an acute admit.  I talk with Dr. Bajwa who will be seeing him.        DISPOSITION AND DISCUSSIONS  I have discussed management of the patient with the following physicians and SHAN's: Nurse practitioner and hospitalist  .    FINAL DIAGNOSIS  1. Cellulitis of right lower extremity    2.  Homelessness       Electronically signed by: Dex Estrada M.D., 6/20/2023 4:12 PM      "

## 2023-06-21 ENCOUNTER — APPOINTMENT (OUTPATIENT)
Dept: CARDIOLOGY | Facility: MEDICAL CENTER | Age: 78
DRG: 603 | End: 2023-06-21
Attending: NURSE PRACTITIONER
Payer: COMMERCIAL

## 2023-06-21 PROBLEM — L03.90 CELLULITIS: Status: ACTIVE | Noted: 2023-06-21

## 2023-06-21 LAB
ALBUMIN SERPL BCP-MCNC: 3.3 G/DL (ref 3.2–4.9)
ALBUMIN/GLOB SERPL: 1.6 G/DL
ALP SERPL-CCNC: 65 U/L (ref 30–99)
ALT SERPL-CCNC: 8 U/L (ref 2–50)
ANION GAP SERPL CALC-SCNC: 10 MMOL/L (ref 7–16)
AST SERPL-CCNC: 11 U/L (ref 12–45)
BASOPHILS # BLD AUTO: 1 % (ref 0–1.8)
BASOPHILS # BLD: 0.05 K/UL (ref 0–0.12)
BILIRUB SERPL-MCNC: 0.6 MG/DL (ref 0.1–1.5)
BUN SERPL-MCNC: 20 MG/DL (ref 8–22)
CALCIUM ALBUM COR SERPL-MCNC: 9.2 MG/DL (ref 8.5–10.5)
CALCIUM SERPL-MCNC: 8.6 MG/DL (ref 8.5–10.5)
CHLORIDE SERPL-SCNC: 110 MMOL/L (ref 96–112)
CO2 SERPL-SCNC: 23 MMOL/L (ref 20–33)
CREAT SERPL-MCNC: 1.09 MG/DL (ref 0.5–1.4)
EOSINOPHIL # BLD AUTO: 0.12 K/UL (ref 0–0.51)
EOSINOPHIL NFR BLD: 2.3 % (ref 0–6.9)
ERYTHROCYTE [DISTWIDTH] IN BLOOD BY AUTOMATED COUNT: 47.4 FL (ref 35.9–50)
GFR SERPLBLD CREATININE-BSD FMLA CKD-EPI: 69 ML/MIN/1.73 M 2
GLOBULIN SER CALC-MCNC: 2.1 G/DL (ref 1.9–3.5)
GLUCOSE SERPL-MCNC: 112 MG/DL (ref 65–99)
HCT VFR BLD AUTO: 39.3 % (ref 42–52)
HGB BLD-MCNC: 13.7 G/DL (ref 14–18)
IMM GRANULOCYTES # BLD AUTO: 0.01 K/UL (ref 0–0.11)
IMM GRANULOCYTES NFR BLD AUTO: 0.2 % (ref 0–0.9)
LV EJECT FRACT  99904: 65
LV EJECT FRACT MOD 2C 99903: 57.37
LV EJECT FRACT MOD 4C 99902: 69.77
LV EJECT FRACT MOD BP 99901: 66.08
LYMPHOCYTES # BLD AUTO: 1.45 K/UL (ref 1–4.8)
LYMPHOCYTES NFR BLD: 27.9 % (ref 22–41)
MCH RBC QN AUTO: 32.7 PG (ref 27–33)
MCHC RBC AUTO-ENTMCNC: 34.9 G/DL (ref 32.3–36.5)
MCV RBC AUTO: 93.8 FL (ref 81.4–97.8)
MONOCYTES # BLD AUTO: 0.57 K/UL (ref 0–0.85)
MONOCYTES NFR BLD AUTO: 11 % (ref 0–13.4)
NEUTROPHILS # BLD AUTO: 3 K/UL (ref 1.82–7.42)
NEUTROPHILS NFR BLD: 57.6 % (ref 44–72)
NRBC # BLD AUTO: 0 K/UL
NRBC BLD-RTO: 0 /100 WBC (ref 0–0.2)
PLATELET # BLD AUTO: 227 K/UL (ref 164–446)
PMV BLD AUTO: 9.3 FL (ref 9–12.9)
POTASSIUM SERPL-SCNC: 4.4 MMOL/L (ref 3.6–5.5)
PROT SERPL-MCNC: 5.4 G/DL (ref 6–8.2)
RBC # BLD AUTO: 4.19 M/UL (ref 4.7–6.1)
SCCMEC + MECA PNL NOSE NAA+PROBE: NEGATIVE
SODIUM SERPL-SCNC: 143 MMOL/L (ref 135–145)
WBC # BLD AUTO: 5.2 K/UL (ref 4.8–10.8)

## 2023-06-21 PROCEDURE — 99232 SBSQ HOSP IP/OBS MODERATE 35: CPT | Performed by: INTERNAL MEDICINE

## 2023-06-21 PROCEDURE — A9270 NON-COVERED ITEM OR SERVICE: HCPCS | Performed by: STUDENT IN AN ORGANIZED HEALTH CARE EDUCATION/TRAINING PROGRAM

## 2023-06-21 PROCEDURE — 36415 COLL VENOUS BLD VENIPUNCTURE: CPT

## 2023-06-21 PROCEDURE — 96366 THER/PROPH/DIAG IV INF ADDON: CPT

## 2023-06-21 PROCEDURE — 85025 COMPLETE CBC W/AUTO DIFF WBC: CPT

## 2023-06-21 PROCEDURE — 700111 HCHG RX REV CODE 636 W/ 250 OVERRIDE (IP): Performed by: STUDENT IN AN ORGANIZED HEALTH CARE EDUCATION/TRAINING PROGRAM

## 2023-06-21 PROCEDURE — 700102 HCHG RX REV CODE 250 W/ 637 OVERRIDE(OP): Performed by: STUDENT IN AN ORGANIZED HEALTH CARE EDUCATION/TRAINING PROGRAM

## 2023-06-21 PROCEDURE — 700105 HCHG RX REV CODE 258: Performed by: STUDENT IN AN ORGANIZED HEALTH CARE EDUCATION/TRAINING PROGRAM

## 2023-06-21 PROCEDURE — 93306 TTE W/DOPPLER COMPLETE: CPT

## 2023-06-21 PROCEDURE — 770006 HCHG ROOM/CARE - MED/SURG/GYN SEMI*

## 2023-06-21 PROCEDURE — 93306 TTE W/DOPPLER COMPLETE: CPT | Mod: 26 | Performed by: INTERNAL MEDICINE

## 2023-06-21 PROCEDURE — 80053 COMPREHEN METABOLIC PANEL: CPT

## 2023-06-21 RX ADMIN — CELECOXIB 100 MG: 100 CAPSULE ORAL at 05:03

## 2023-06-21 RX ADMIN — AMPICILLIN AND SULBACTAM 3 G: 1; 2 INJECTION, POWDER, FOR SOLUTION INTRAMUSCULAR; INTRAVENOUS at 00:15

## 2023-06-21 RX ADMIN — ACETAMINOPHEN 1000 MG: 500 TABLET, FILM COATED ORAL at 20:35

## 2023-06-21 RX ADMIN — RIVAROXABAN 10 MG: 10 TABLET, FILM COATED ORAL at 17:24

## 2023-06-21 RX ADMIN — Medication 5 MG: at 20:35

## 2023-06-21 RX ADMIN — CEFAZOLIN 2 G: 2 INJECTION, POWDER, FOR SOLUTION INTRAMUSCULAR; INTRAVENOUS at 17:22

## 2023-06-21 RX ADMIN — AMPICILLIN AND SULBACTAM 3 G: 1; 2 INJECTION, POWDER, FOR SOLUTION INTRAMUSCULAR; INTRAVENOUS at 12:48

## 2023-06-21 RX ADMIN — AMPICILLIN AND SULBACTAM 3 G: 1; 2 INJECTION, POWDER, FOR SOLUTION INTRAMUSCULAR; INTRAVENOUS at 18:01

## 2023-06-21 RX ADMIN — OXYCODONE HYDROCHLORIDE 5 MG: 5 TABLET ORAL at 17:59

## 2023-06-21 RX ADMIN — AMPICILLIN AND SULBACTAM 3 G: 1; 2 INJECTION, POWDER, FOR SOLUTION INTRAMUSCULAR; INTRAVENOUS at 05:57

## 2023-06-21 RX ADMIN — CEFAZOLIN 2 G: 2 INJECTION, POWDER, FOR SOLUTION INTRAMUSCULAR; INTRAVENOUS at 04:59

## 2023-06-21 RX ADMIN — ACETAMINOPHEN 1000 MG: 500 TABLET, FILM COATED ORAL at 09:22

## 2023-06-21 RX ADMIN — SENNOSIDES AND DOCUSATE SODIUM 2 TABLET: 50; 8.6 TABLET ORAL at 05:03

## 2023-06-21 ASSESSMENT — ENCOUNTER SYMPTOMS
MYALGIAS: 1
CARDIOVASCULAR NEGATIVE: 1
ROS SKIN COMMENTS: RLE ERYTHEMA
GASTROINTESTINAL NEGATIVE: 1
BLURRED VISION: 1
WEAKNESS: 1
CONSTITUTIONAL NEGATIVE: 1
NEUROLOGICAL NEGATIVE: 1
EYES NEGATIVE: 1
RESPIRATORY NEGATIVE: 1
PSYCHIATRIC NEGATIVE: 1
FEVER: 1
SHORTNESS OF BREATH: 1
CHILLS: 1
FALLS: 1

## 2023-06-21 ASSESSMENT — PAIN DESCRIPTION - PAIN TYPE: TYPE: CHRONIC PAIN

## 2023-06-21 NOTE — PROGRESS NOTES
Pt a&ox4,BL feet is with dirt,soaked in soap water and cleaned,pt offered shower refused,pt is blind and need help to set up tray,no c/o pain now.

## 2023-06-21 NOTE — PROGRESS NOTES
Patient arrived at 15:40 with walker with seat, cellphone, and clothes. Patient is A&O x4. On room air. Patient showered prior to getting into bed.

## 2023-06-21 NOTE — CARE PLAN
The patient is Watcher - Medium risk of patient condition declining or worsening         Progress made toward(s) clinical / shift goals:  iv abx    Patient is not progressing towards the following goals:      Problem: Knowledge Deficit - Standard  Goal: Patient and family/care givers will demonstrate understanding of plan of care, disease process/condition, diagnostic tests and medications  Outcome: Progressing     Problem: Fall Risk  Goal: Patient will remain free from falls  Outcome: Progressing

## 2023-06-21 NOTE — PROGRESS NOTES
2 RN Skin Assessment Completed by , RN and , RN.     Head: WDL  Ears: WDL  Nose: WDL  Mouth: WDL  Neck: WDL  Breasts/Chest: WDL  Shoulder Blades: WDL  Spine: WDL  (R) Arm/Elbow/hand: WDL  (L) Arm/Elbow/hand: WDL  Abdomen:WDL  Groin: WDL  Sacrum/Coccyx/Buttocks: wdl  (R) Leg:swelling  (L) Leg: WDL  (R) Heel/Foot/Toe: redness,edema,over grown nail  (L) Heel/Foot/Toe: redness,edema,over grown nail   picture-yes   wound consult-done

## 2023-06-21 NOTE — PROGRESS NOTES
Daily Progress Note    Date of Service  6/21/2023    Chief Complaint  June Ventura is a 77 y.o. male admitted 6/20/2023 with right leg swelling.    Hospital Course  This is a 77-year-old patient with a PMH of BPH, blindness, tobacco, and chronic right lower extremity edema who presents to the emergency department on 6/20/23 with acutely worsening chronic right lower extremity edema. Pain in the right leg is sharp, 10/10, worse with ambulation, and relieved with ibuprofen. He reports fevers, chills, and a recent ground-level fall 4 days ago.     In the emergency department vitals were stable, he was afebrile, and on room air. CBC showed no leukocytosis or anemia. Chemistry without electrolyte imbalances and normal lactic acid. Blood cultures were collected, along with a MRSA nasal swab and the patient was started on IV cefazolin for right lower extremity cellulitis.        Interval Problem Update  -patient was seen and examined today with complaints of swelling, pain, and decreased sensation to RLE  -plan: iv antibiotics for RLE cellulitis, wound consult placed for toenail clipping, echo ordered, follow blood cultures  -disposition: patient will require 1-2 more days for IV abx, wound consult and echo  -Labs: reviewed and expected  -VSS for today, patient is on room air    I have discussed this patient's plan of care and discharge plan at IDT rounds today with Case Management, Nursing, Nursing leadership, and other members of the IDT team.    Consultants/Specialty  N/A    Code Status  Full Code    Disposition  The patient is not medically cleared for discharge to home or a post-acute facility.  Anticipate discharge to: home with close outpatient follow-up    I have placed the appropriate orders for post-discharge needs.    Review of Systems  Review of Systems   Constitutional: Negative.    HENT: Negative.     Eyes: Negative.         Legally blind 2/2 macular degeneration   Respiratory: Negative.     Cardiovascular:   Positive for leg swelling.        Right worse than left   Gastrointestinal: Negative.    Genitourinary: Negative.    Musculoskeletal:  Positive for joint pain.   Skin:         RLE erythema   Neurological: Negative.    Endo/Heme/Allergies: Negative.    Psychiatric/Behavioral: Negative.          Physical Exam  Temp:  [36.4 °C (97.5 °F)-36.9 °C (98.4 °F)] 36.7 °C (98.1 °F)  Pulse:  [72-93] 81  Resp:  [17-19] 18  BP: (118-164)/(60-84) 163/73  SpO2:  [93 %-98 %] 93 %    Physical Exam  Constitutional:       Appearance: He is ill-appearing.   HENT:      Head: Normocephalic.      Right Ear: Ear canal normal.      Left Ear: Ear canal normal.      Nose: Nose normal.      Mouth/Throat:      Mouth: Mucous membranes are moist.   Eyes:      Extraocular Movements: Extraocular movements intact.      Conjunctiva/sclera: Conjunctivae normal.      Pupils: Pupils are equal, round, and reactive to light.   Cardiovascular:      Rate and Rhythm: Normal rate and regular rhythm.      Pulses: Normal pulses.      Heart sounds: Normal heart sounds.   Pulmonary:      Effort: Pulmonary effort is normal.      Breath sounds: Normal breath sounds.   Abdominal:      General: Abdomen is flat. Bowel sounds are normal.      Palpations: Abdomen is soft.   Musculoskeletal:      Cervical back: Normal range of motion.      Right lower leg: Edema present.      Left lower leg: Edema present.      Comments: Rle edema worse than Lle edema   Skin:     General: Skin is warm.      Findings: Erythema present.      Comments: RLE warm/erythema     Neurological:      Mental Status: He is alert.      Sensory: Sensory deficit present.      Comments: Legally blind 2/2 macular degeneration    Decreased sensation to RLE   Psychiatric:         Mood and Affect: Mood normal.         Fluids    Intake/Output Summary (Last 24 hours) at 6/21/2023 1241  Last data filed at 6/21/2023 0557  Gross per 24 hour   Intake --   Output 400 ml   Net -400 ml       Laboratory  Recent Labs      06/20/23  1423 06/21/23  0030   WBC 5.9 5.2   RBC 4.62* 4.19*   HEMOGLOBIN 15.3 13.7*   HEMATOCRIT 43.6 39.3*   MCV 94.4 93.8   MCH 33.1* 32.7   MCHC 35.1 34.9   RDW 48.0 47.4   PLATELETCT 236 227   MPV 9.1 9.3     Recent Labs     06/20/23  1423 06/21/23  0030   SODIUM 141 143   POTASSIUM 4.3 4.4   CHLORIDE 107 110   CO2 23 23   GLUCOSE 93 112*   BUN 19 20   CREATININE 1.00 1.09   CALCIUM 9.0 8.6                   Imaging  DX-FOOT-COMPLETE 3+ RIGHT   Final Result      1.  No fracture or dislocation of RIGHT foot.   2.  Diffuse soft tissue swelling.   3.  Moderate osteoarthritis.      EC-ECHOCARDIOGRAM COMPLETE W/ CONT    (Results Pending)        Assessment/Plan  Cellulitis (present on admission)  -continue IV Unasyn 3g q 6hrs  -continue IV Cefazolin 2g q 8hrs  -monitor edema/erythema to RLE  -monitor vital signs  -Wound consult for toenail clipping    BPH (present on admission)  -monitor urine output    Tobacco (present on admission)  -education provided on smoking cessation    Chronic right lower extremity edema (present on admission)  -scds ordered  -xarelto ordered for vte prophylaxis  -echo ordered      Chronic back pain (present on admission)  -continue Norco (home med)  -continue Celebrex (home med)         VTE prophylaxis: enoxaparin ppx    I have performed a physical exam and reviewed and updated ROS and Plan today (6/21/2023). In review of yesterday's note (6/20/2023), there are no changes except as documented above.

## 2023-06-21 NOTE — CARE PLAN
The patient is Watcher - Medium risk of patient condition declining or worsening    Shift Goals  Clinical Goals: no pain, moniotr for s/sof infection, IVABX, wound consult, rest  Patient Goals: no pain, rest    Progress made toward(s) clinical / shift goals:    Problem: Knowledge Deficit - Standard  Goal: Patient and family/care givers will demonstrate understanding of plan of care, disease process/condition, diagnostic tests and medications  Description: Target End Date:  1-3 days or as soon as patient condition allows    Document in Patient Education    1.  Patient and family/caregiver oriented to unit, equipment, visitation policy and means for communicating concern  2.  Complete/review Learning Assessment  3.  Assess knowledge level of disease process/condition, treatment plan, diagnostic tests and medications  4.  Explain disease process/condition, treatment plan, diagnostic tests and medications  Outcome: Progressing     Problem: Fall Risk  Goal: Patient will remain free from falls  Description: Target End Date:  Prior to discharge or change in level of care    Document interventions on the [facility] Fall Risk Assessment    1.  Assess for fall risk factors  2.  Implement fall precautions  Outcome: Progressing     Problem: Pain - Standard  Goal: Alleviation of pain or a reduction in pain to the patient’s comfort goal  Description: Target End Date:  Prior to discharge or change in level of care    Document on Vitals flowsheet    1.  Document pain using the appropriate pain scale per order or unit policy  2.  Educate and implement non-pharmacologic comfort measures (i.e. relaxation, distraction, massage, cold/heat therapy, etc.)  3.  Pain management medications as ordered  4.  Reassess pain after pain med administration per policy  5.  If opiods administered assess patient's response to pain medication is appropriate per POSS sedation scale  6.  Follow pain management plan developed in collaboration with  patient and interdisciplinary team (including palliative care or pain specialists if applicable)  Outcome: Progressing       Patient is not progressing towards the following goals:

## 2023-06-21 NOTE — PROGRESS NOTES
[facility] Medicine Daily Progress Note    Date of Service  6/21/2023    Chief Complaint  June Ventura is a 77 y.o. male admitted 6/20/2023 with BLE swelling    Hospital Course  Mr. June Ventura is a 77-year-old homeless male with a past medical history of BPH, blindness and chronic right lower extremity swelling presents emergency department on 6/20/2023 with acutely worsening chronic right lower extremity inflammation.      Patient reports that he had a ground-level fall 4 days ago and has been having worsening 10 out of 10, sharp right leg pain.  Pain worsens with ambulation or use of extremity and improves with ibuprofen.  He associates symptoms with subjective fevers and chills, no sweats or loss of consciousness.  Patient denies any nausea, no vomiting.  Patient legally blind due to macular degeneration.  Patient also lives at the [facility] as he is currently homeless.     In the emergency department, stable vitals and patient saturating well room air.  CBC without leukocytosis or anemia.  Chemistry without gross normalities.  Lactic acid low.  Blood samples were collected for culture and patient was started on Ancef antibiotic regimen.  UDS also came back positive for methamphetamine.  Patient was admitted for right lower extremity cellulitis requiring IV antibiotics, further work-up and management.    Continue IV antibiotics for management of lower extremity cellulitis.  Limb preservation consulted for lower extremity cellulitis.  Wound team consulted for lower extremity wound management along with nail clipping.  Ordered echocardiogram due to lower extremity swelling and due to possible significant history of drug use.    Interval Problem Update  -Patient seen and examined.  Noted bilateral lower extremity swelling greater on the right than left.  Patient also noted to have significantly long toenails of which I had consulted wound to get this trimmed.  Discussed and counseled patient in regards to  methamphetamine use.  Patient denies any use however he currently lives at the [facility].  We will order some echocardiogram due to bilateral leg swelling and some mild shortness of breath.  -Plan of care: Continue to monitor patient; continue IV antibiotics; await limb preservation for management of bilateral lower extremity swelling; wound consulted for management of lower extremity wounds along with nail trim; obtain echocardiogram due to leg swelling and history of possible drug abuse  -Disposition: Patient switched to inpatient status as he is anticipated to stay 2-3 midnights for management of bilateral lower extremity cellulitis with antibiotics, wound and limb preservation recommendations, and obtain echocardiogram  -Lab work: Reviewed; expected  -VSS at this time    I have discussed this patient's plan of care and discharge plan at IDT rounds today with Case Management, Nursing, Nursing leadership, and other members of the IDT team.    Consultants/Specialty  LPS and wound    Code Status  Full Code    Disposition  The patient is not medically cleared for discharge to home or a post-acute facility.  Anticipate discharge to: home with close outpatient follow-up    I have placed the appropriate orders for post-discharge needs.    Review of Systems  Review of Systems   Constitutional:  Positive for chills, fever and malaise/fatigue.   HENT: Negative.     Eyes:  Positive for blurred vision.   Respiratory:  Positive for shortness of breath.    Cardiovascular: Negative.    Gastrointestinal: Negative.    Genitourinary: Negative.    Musculoskeletal:  Positive for falls and myalgias.   Skin:  Positive for itching and rash.   Neurological:  Positive for weakness.   Endo/Heme/Allergies: Negative.    Psychiatric/Behavioral: Negative.          Physical Exam  Temp:  [36.4 °C (97.5 °F)-36.9 °C (98.4 °F)] 36.7 °C (98.1 °F)  Pulse:  [72-93] 81  Resp:  [17-19] 18  BP: (118-164)/(60-84) 163/73  SpO2:  [93 %-98 %] 93  %    Physical Exam  Vitals and nursing note reviewed.   HENT:      Head: Normocephalic.      Nose: Nose normal.      Mouth/Throat:      Mouth: Mucous membranes are moist.      Pharynx: Oropharynx is clear.   Eyes:      Pupils: Pupils are equal, round, and reactive to light.   Cardiovascular:      Rate and Rhythm: Regular rhythm.      Pulses: Normal pulses.      Heart sounds: Normal heart sounds.   Pulmonary:      Effort: Pulmonary effort is normal.      Breath sounds: Normal breath sounds.   Abdominal:      General: Bowel sounds are normal.      Palpations: Abdomen is soft.   Musculoskeletal:         General: Swelling and tenderness present.      Cervical back: Normal range of motion and neck supple.      Right lower leg: Edema present.      Left lower leg: Edema present.   Skin:     General: Skin is dry.      Capillary Refill: Capillary refill takes 2 to 3 seconds.      Findings: Erythema present.   Neurological:      Mental Status: He is alert. Mental status is at baseline.      Motor: Weakness present.         Fluids    Intake/Output Summary (Last 24 hours) at 6/21/2023 1226  Last data filed at 6/21/2023 0557  Gross per 24 hour   Intake --   Output 400 ml   Net -400 ml       Laboratory  Recent Labs     06/20/23  1423 06/21/23  0030   WBC 5.9 5.2   RBC 4.62* 4.19*   HEMOGLOBIN 15.3 13.7*   HEMATOCRIT 43.6 39.3*   MCV 94.4 93.8   MCH 33.1* 32.7   MCHC 35.1 34.9   RDW 48.0 47.4   PLATELETCT 236 227   MPV 9.1 9.3     Recent Labs     06/20/23  1423 06/21/23  0030   SODIUM 141 143   POTASSIUM 4.3 4.4   CHLORIDE 107 110   CO2 23 23   GLUCOSE 93 112*   BUN 19 20   CREATININE 1.00 1.09   CALCIUM 9.0 8.6                   Imaging  DX-FOOT-COMPLETE 3+ RIGHT   Final Result      1.  No fracture or dislocation of RIGHT foot.   2.  Diffuse soft tissue swelling.   3.  Moderate osteoarthritis.      EC-ECHOCARDIOGRAM COMPLETE W/ CONT    (Results Pending)        Assessment/Plan  * Cellulitis of great toe of right foot- (present on  admission)  Assessment & Plan  -Acute on chronic  -Worsening swelling  -Stable vitals and labs  -Received Ancef to ED  -Switch to IV Unasyn  -Pending MRSA nares  -Limb preservation  -Consulted wound for lower extremity cellulitis and nail trim    Homeless- (present on admission)  Assessment & Plan  -Poor follow-up  -Case management  -Resides at homeless shelter    Stage 3a chronic kidney disease (HCC)- (present on admission)  Assessment & Plan  - Monitor renal function  -Renally dosed medication    BPH (benign prostatic hyperplasia)- (present on admission)  Assessment & Plan  -History of  -Postvoid residual         VTE prophylaxis: Xarelto 10 mg daily as prophylaxis    I have performed a physical exam and reviewed and updated ROS and Plan today (6/21/2023). In review of yesterday's note (6/20/2023), there are no changes except as documented above.    IAndrés A.P.R.N. performed a substantiated portion of the service face-to-face with same patient on the same date of service INDEPENDENTLY FROM THE MD ON ASSESSMENT, EXAMINATION, AND DISCUSSION AND PLAN OF CARE FOR 18 MINUTES.  I was personally involved in reviewing and conducting the medical decision making, including the information as described above.    =============================================================================================================================================================================================================================================================================================  Please note that this dictation was created using voice recognition software. I have made every reasonable attempt to correct obvious errors, but there may be errors of grammar and possibly content that I did not discover before finalizing the note.    Electronically signed by:  Dr. ROBIN Egan, DNP, APRN, FNP-C  Hospitalist Services  [facility]  [phone]  [phone]  [email or web address]  06/21/23                 [phone]

## 2023-06-21 NOTE — HOSPITAL COURSE
Mr. June Ventura is a 77-year-old homeless male with a past medical history of BPH, blindness and chronic right lower extremity swelling presents emergency department on 6/20/2023 with acutely worsening chronic right lower extremity inflammation.      Patient reports that he had a ground-level fall 4 days ago and has been having worsening 10 out of 10, sharp right leg pain.  Pain worsens with ambulation or use of extremity and improves with ibuprofen.  He associates symptoms with subjective fevers and chills, no sweats or loss of consciousness.  Patient denies any nausea, no vomiting.  Patient legally blind due to macular degeneration.  Patient also lives at the [facility] as he is currently homeless.     In the emergency department, stable vitals and patient saturating well room air.  CBC without leukocytosis or anemia.  Chemistry without gross normalities.  Lactic acid low.  Blood samples were collected for culture and patient was started on Ancef antibiotic regimen.  UDS also came back positive for methamphetamine.  Patient was admitted for right lower extremity cellulitis requiring IV antibiotics, further work-up and management.    Continue IV antibiotics for management of lower extremity cellulitis.  Limb preservation consulted for lower extremity cellulitis.  Wound team consulted for lower extremity wound management along with nail clipping.  Ordered echocardiogram due to lower extremity swelling and due to possible significant history of drug use.

## 2023-06-22 LAB
ANION GAP SERPL CALC-SCNC: 11 MMOL/L (ref 7–16)
BUN SERPL-MCNC: 12 MG/DL (ref 8–22)
CALCIUM SERPL-MCNC: 8.9 MG/DL (ref 8.5–10.5)
CHLORIDE SERPL-SCNC: 106 MMOL/L (ref 96–112)
CO2 SERPL-SCNC: 22 MMOL/L (ref 20–33)
CREAT SERPL-MCNC: 0.95 MG/DL (ref 0.5–1.4)
CRP SERPL HS-MCNC: 1.08 MG/DL (ref 0–0.75)
ERYTHROCYTE [DISTWIDTH] IN BLOOD BY AUTOMATED COUNT: 46.4 FL (ref 35.9–50)
GFR SERPLBLD CREATININE-BSD FMLA CKD-EPI: 82 ML/MIN/1.73 M 2
GLUCOSE SERPL-MCNC: 90 MG/DL (ref 65–99)
HCT VFR BLD AUTO: 42.4 % (ref 42–52)
HGB BLD-MCNC: 14.5 G/DL (ref 14–18)
MCH RBC QN AUTO: 32.1 PG (ref 27–33)
MCHC RBC AUTO-ENTMCNC: 34.2 G/DL (ref 32.3–36.5)
MCV RBC AUTO: 93.8 FL (ref 81.4–97.8)
PLATELET # BLD AUTO: 238 K/UL (ref 164–446)
PMV BLD AUTO: 8.9 FL (ref 9–12.9)
POTASSIUM SERPL-SCNC: 4.3 MMOL/L (ref 3.6–5.5)
RBC # BLD AUTO: 4.52 M/UL (ref 4.7–6.1)
SODIUM SERPL-SCNC: 139 MMOL/L (ref 135–145)
WBC # BLD AUTO: 5.5 K/UL (ref 4.8–10.8)

## 2023-06-22 PROCEDURE — 85027 COMPLETE CBC AUTOMATED: CPT

## 2023-06-22 PROCEDURE — 36415 COLL VENOUS BLD VENIPUNCTURE: CPT

## 2023-06-22 PROCEDURE — 99232 SBSQ HOSP IP/OBS MODERATE 35: CPT | Mod: 25 | Performed by: STUDENT IN AN ORGANIZED HEALTH CARE EDUCATION/TRAINING PROGRAM

## 2023-06-22 PROCEDURE — 700105 HCHG RX REV CODE 258: Performed by: STUDENT IN AN ORGANIZED HEALTH CARE EDUCATION/TRAINING PROGRAM

## 2023-06-22 PROCEDURE — 700102 HCHG RX REV CODE 250 W/ 637 OVERRIDE(OP): Performed by: STUDENT IN AN ORGANIZED HEALTH CARE EDUCATION/TRAINING PROGRAM

## 2023-06-22 PROCEDURE — 86140 C-REACTIVE PROTEIN: CPT

## 2023-06-22 PROCEDURE — A9270 NON-COVERED ITEM OR SERVICE: HCPCS | Performed by: STUDENT IN AN ORGANIZED HEALTH CARE EDUCATION/TRAINING PROGRAM

## 2023-06-22 PROCEDURE — 700111 HCHG RX REV CODE 636 W/ 250 OVERRIDE (IP): Performed by: STUDENT IN AN ORGANIZED HEALTH CARE EDUCATION/TRAINING PROGRAM

## 2023-06-22 PROCEDURE — 80048 BASIC METABOLIC PNL TOTAL CA: CPT

## 2023-06-22 PROCEDURE — 99406 BEHAV CHNG SMOKING 3-10 MIN: CPT | Performed by: STUDENT IN AN ORGANIZED HEALTH CARE EDUCATION/TRAINING PROGRAM

## 2023-06-22 PROCEDURE — 770006 HCHG ROOM/CARE - MED/SURG/GYN SEMI*

## 2023-06-22 RX ORDER — ENOXAPARIN SODIUM 100 MG/ML
40 INJECTION SUBCUTANEOUS DAILY
Status: DISCONTINUED | OUTPATIENT
Start: 2023-06-22 | End: 2023-06-23 | Stop reason: HOSPADM

## 2023-06-22 RX ADMIN — ENOXAPARIN SODIUM 40 MG: 100 INJECTION SUBCUTANEOUS at 17:56

## 2023-06-22 RX ADMIN — CEFAZOLIN 2 G: 2 INJECTION, POWDER, FOR SOLUTION INTRAMUSCULAR; INTRAVENOUS at 00:02

## 2023-06-22 RX ADMIN — ACETAMINOPHEN 1000 MG: 500 TABLET, FILM COATED ORAL at 20:23

## 2023-06-22 RX ADMIN — AMPICILLIN AND SULBACTAM 3 G: 1; 2 INJECTION, POWDER, FOR SOLUTION INTRAMUSCULAR; INTRAVENOUS at 00:46

## 2023-06-22 RX ADMIN — ACETAMINOPHEN 1000 MG: 500 TABLET, FILM COATED ORAL at 15:23

## 2023-06-22 RX ADMIN — AMPICILLIN AND SULBACTAM 3 G: 1; 2 INJECTION, POWDER, FOR SOLUTION INTRAMUSCULAR; INTRAVENOUS at 17:57

## 2023-06-22 RX ADMIN — CEFAZOLIN 2 G: 2 INJECTION, POWDER, FOR SOLUTION INTRAMUSCULAR; INTRAVENOUS at 13:17

## 2023-06-22 RX ADMIN — CEFAZOLIN 2 G: 2 INJECTION, POWDER, FOR SOLUTION INTRAMUSCULAR; INTRAVENOUS at 06:05

## 2023-06-22 RX ADMIN — SENNOSIDES AND DOCUSATE SODIUM 2 TABLET: 50; 8.6 TABLET ORAL at 04:09

## 2023-06-22 RX ADMIN — OXYCODONE HYDROCHLORIDE 5 MG: 5 TABLET ORAL at 04:09

## 2023-06-22 RX ADMIN — ACETAMINOPHEN 1000 MG: 500 TABLET, FILM COATED ORAL at 09:09

## 2023-06-22 RX ADMIN — AMPICILLIN AND SULBACTAM 3 G: 1; 2 INJECTION, POWDER, FOR SOLUTION INTRAMUSCULAR; INTRAVENOUS at 23:33

## 2023-06-22 RX ADMIN — AMPICILLIN AND SULBACTAM 3 G: 1; 2 INJECTION, POWDER, FOR SOLUTION INTRAMUSCULAR; INTRAVENOUS at 15:23

## 2023-06-22 RX ADMIN — Medication 5 MG: at 20:23

## 2023-06-22 RX ADMIN — OXYCODONE HYDROCHLORIDE 5 MG: 5 TABLET ORAL at 20:23

## 2023-06-22 RX ADMIN — AMPICILLIN AND SULBACTAM 3 G: 1; 2 INJECTION, POWDER, FOR SOLUTION INTRAMUSCULAR; INTRAVENOUS at 06:43

## 2023-06-22 RX ADMIN — CELECOXIB 100 MG: 100 CAPSULE ORAL at 04:10

## 2023-06-22 ASSESSMENT — ENCOUNTER SYMPTOMS
BLURRED VISION: 1
MYALGIAS: 1
GASTROINTESTINAL NEGATIVE: 1
SHORTNESS OF BREATH: 1
FEVER: 1
WEAKNESS: 1
FALLS: 1
CARDIOVASCULAR NEGATIVE: 1
PSYCHIATRIC NEGATIVE: 1
CHILLS: 1

## 2023-06-22 ASSESSMENT — PAIN DESCRIPTION - PAIN TYPE
TYPE: CHRONIC PAIN
TYPE: CHRONIC PAIN

## 2023-06-22 NOTE — CARE PLAN
The patient is Stable - Low risk of patient condition declining or worsening    Shift Goals  Clinical Goals: IV antibiotics control, pain control  Patient Goals: pain control    Progress made toward(s) clinical / shift goals:  Patient progressing towards goals. Call light within reach. Hourly rounding.     Patient is not progressing towards the following goals:      Problem: Knowledge Deficit - Standard  Goal: Patient and family/care givers will demonstrate understanding of plan of care, disease process/condition, diagnostic tests and medications  Outcome: Progressing     Problem: Fall Risk  Goal: Patient will remain free from falls  Outcome: Progressing     Problem: Pain - Standard  Goal: Alleviation of pain or a reduction in pain to the patient’s comfort goal  Outcome: Progressing     Problem: Skin Integrity  Goal: Skin integrity is maintained or improved  Outcome: Progressing

## 2023-06-22 NOTE — CARE PLAN
The patient is Stable - Low risk of patient condition declining or worsening    Shift Goals  Clinical Goals: IV antibiotics control, pain control  Patient Goals: pain control    Progress made toward(s) clinical / shift goals:  Continues on IV antibiotics for R foot cellulitis, needed items within reach, able to make his needs known.     Patient is not progressing towards the following goals: N/A    Problem: Knowledge Deficit - Standard  Goal: Patient and family/care givers will demonstrate understanding of plan of care, disease process/condition, diagnostic tests and medications  Outcome: Progressing     Problem: Fall Risk  Goal: Patient will remain free from falls  Outcome: Progressing     Problem: Pain - Standard  Goal: Alleviation of pain or a reduction in pain to the patient’s comfort goal  Outcome: Progressing     Problem: Skin Integrity  Goal: Skin integrity is maintained or improved  Outcome: Progressing

## 2023-06-22 NOTE — PROGRESS NOTES
Hospital Medicine Daily Progress Note    Date of Service  6/22/2023    Chief Complaint  June Ventura is a 77 y.o. male admitted 6/20/2023 with BLE swelling    Hospital Course  77-year-old homeless male with a past medical history of BPH, blindness and chronic right lower extremity swelling presents emergency department on 6/20/2023 with acutely worsening chronic right lower extremity inflammation.  R foot xray: No fracture or dislocation.  Diffuse soft tissue swelling.  Doppler pending    Interval Problem Update  Seen patient at bedside.  He is somehow on both Unasyn and Ancef. I have discontinued Ancef.   Pending Doppler  Wound care.     I have discussed this patient's plan of care and discharge plan at IDT rounds today with Case Management, Nursing, Nursing leadership, and other members of the IDT team.    Consultants/Specialty  na    Code Status  Full Code    Disposition  The patient is not medically cleared for discharge to home or a post-acute facility.      I have placed the appropriate orders for post-discharge needs.    Review of Systems  Review of Systems   Constitutional:  Positive for chills, fever and malaise/fatigue.   HENT: Negative.     Eyes:  Positive for blurred vision.   Respiratory:  Positive for shortness of breath.    Cardiovascular: Negative.    Gastrointestinal: Negative.    Genitourinary: Negative.    Musculoskeletal:  Positive for falls and myalgias.   Skin:  Positive for itching and rash.   Neurological:  Positive for weakness.   Endo/Heme/Allergies: Negative.    Psychiatric/Behavioral: Negative.          Physical Exam  Temp:  [36.2 °C (97.2 °F)-36.7 °C (98 °F)] 36.2 °C (97.2 °F)  Pulse:  [66-79] 66  Resp:  [17-18] 17  BP: (149-160)/(53-73) 150/53  SpO2:  [96 %-97 %] 96 %    Physical Exam  Vitals and nursing note reviewed.   HENT:      Head: Normocephalic.      Nose: Nose normal.      Mouth/Throat:      Mouth: Mucous membranes are moist.      Pharynx: Oropharynx is clear.   Eyes:       Pupils: Pupils are equal, round, and reactive to light.   Cardiovascular:      Rate and Rhythm: Regular rhythm.      Pulses: Normal pulses.      Heart sounds: Normal heart sounds.   Pulmonary:      Effort: Pulmonary effort is normal.      Breath sounds: Normal breath sounds.   Abdominal:      General: Bowel sounds are normal.      Palpations: Abdomen is soft.   Musculoskeletal:         General: Swelling and tenderness present.      Cervical back: Normal range of motion and neck supple.      Right lower leg: Edema present.      Left lower leg: Edema present.   Skin:     General: Skin is dry.      Capillary Refill: Capillary refill takes 2 to 3 seconds.      Findings: Erythema present.   Neurological:      Mental Status: He is alert. Mental status is at baseline.      Motor: Weakness present.         Fluids    Intake/Output Summary (Last 24 hours) at 6/22/2023 1523  Last data filed at 6/22/2023 1400  Gross per 24 hour   Intake 940 ml   Output 1275 ml   Net -335 ml         Laboratory  Recent Labs     06/20/23  1423 06/21/23  0030 06/22/23  0433   WBC 5.9 5.2 5.5   RBC 4.62* 4.19* 4.52*   HEMOGLOBIN 15.3 13.7* 14.5   HEMATOCRIT 43.6 39.3* 42.4   MCV 94.4 93.8 93.8   MCH 33.1* 32.7 32.1   MCHC 35.1 34.9 34.2   RDW 48.0 47.4 46.4   PLATELETCT 236 227 238   MPV 9.1 9.3 8.9*       Recent Labs     06/20/23  1423 06/21/23  0030 06/22/23  0433   SODIUM 141 143 139   POTASSIUM 4.3 4.4 4.3   CHLORIDE 107 110 106   CO2 23 23 22   GLUCOSE 93 112* 90   BUN 19 20 12   CREATININE 1.00 1.09 0.95   CALCIUM 9.0 8.6 8.9                     Imaging  EC-ECHOCARDIOGRAM COMPLETE W/O CONT   Final Result      DX-FOOT-COMPLETE 3+ RIGHT   Final Result      1.  No fracture or dislocation of RIGHT foot.   2.  Diffuse soft tissue swelling.   3.  Moderate osteoarthritis.      US-EXTREMITY VENOUS LOWER UNILAT RIGHT    (Results Pending)          Assessment/Plan  * Cellulitis of great toe of right foot- (present on admission)  Assessment & Plan  -Acute  on chronic  -Worsening swelling  -MRSA swab negative, cont Unasyn  -Doppler pending    Homeless- (present on admission)  Assessment & Plan  -Poor follow-up  -Case management  -Resides at homeless shelter    Stage 3a chronic kidney disease (HCC)- (present on admission)  Assessment & Plan  - Monitor renal function  -Renally dosed medication    BPH (benign prostatic hyperplasia)- (present on admission)  Assessment & Plan  -History of  -Postvoid residual    Tobacco abuse- (present on admission)  Assessment & Plan  5 minutes on tobacco cessation counseling provided including nicotine patches, gum, and dangers of smoking. Discussed the risks of smoking including increased risk of heart disease, stroke, cancer and COPD. Discussed the benefits of quitting smoking.            VTE prophylaxis: enoxaparin ppx    I have performed a physical exam and reviewed and updated ROS and Plan today (6/22/2023). In review of yesterday's note (6/21/2023), there are no changes except as documented above.

## 2023-06-23 ENCOUNTER — APPOINTMENT (OUTPATIENT)
Dept: RADIOLOGY | Facility: MEDICAL CENTER | Age: 78
DRG: 603 | End: 2023-06-23
Attending: STUDENT IN AN ORGANIZED HEALTH CARE EDUCATION/TRAINING PROGRAM
Payer: COMMERCIAL

## 2023-06-23 ENCOUNTER — PHARMACY VISIT (OUTPATIENT)
Dept: PHARMACY | Facility: MEDICAL CENTER | Age: 78
End: 2023-06-23
Payer: COMMERCIAL

## 2023-06-23 VITALS
TEMPERATURE: 99.4 F | OXYGEN SATURATION: 94 % | SYSTOLIC BLOOD PRESSURE: 145 MMHG | HEART RATE: 72 BPM | DIASTOLIC BLOOD PRESSURE: 66 MMHG | RESPIRATION RATE: 18 BRPM | WEIGHT: 142.42 LBS | HEIGHT: 68 IN | BODY MASS INDEX: 21.58 KG/M2

## 2023-06-23 PROCEDURE — 700111 HCHG RX REV CODE 636 W/ 250 OVERRIDE (IP): Performed by: STUDENT IN AN ORGANIZED HEALTH CARE EDUCATION/TRAINING PROGRAM

## 2023-06-23 PROCEDURE — 97535 SELF CARE MNGMENT TRAINING: CPT

## 2023-06-23 PROCEDURE — RXMED WILLOW AMBULATORY MEDICATION CHARGE: Performed by: STUDENT IN AN ORGANIZED HEALTH CARE EDUCATION/TRAINING PROGRAM

## 2023-06-23 PROCEDURE — A9270 NON-COVERED ITEM OR SERVICE: HCPCS | Performed by: STUDENT IN AN ORGANIZED HEALTH CARE EDUCATION/TRAINING PROGRAM

## 2023-06-23 PROCEDURE — 700102 HCHG RX REV CODE 250 W/ 637 OVERRIDE(OP): Performed by: STUDENT IN AN ORGANIZED HEALTH CARE EDUCATION/TRAINING PROGRAM

## 2023-06-23 PROCEDURE — 93971 EXTREMITY STUDY: CPT | Mod: RT

## 2023-06-23 PROCEDURE — 97162 PT EVAL MOD COMPLEX 30 MIN: CPT

## 2023-06-23 PROCEDURE — 99497 ADVNCD CARE PLAN 30 MIN: CPT | Performed by: STUDENT IN AN ORGANIZED HEALTH CARE EDUCATION/TRAINING PROGRAM

## 2023-06-23 PROCEDURE — 99238 HOSP IP/OBS DSCHRG MGMT 30/<: CPT | Mod: 25 | Performed by: STUDENT IN AN ORGANIZED HEALTH CARE EDUCATION/TRAINING PROGRAM

## 2023-06-23 PROCEDURE — 700105 HCHG RX REV CODE 258: Performed by: STUDENT IN AN ORGANIZED HEALTH CARE EDUCATION/TRAINING PROGRAM

## 2023-06-23 PROCEDURE — 97165 OT EVAL LOW COMPLEX 30 MIN: CPT

## 2023-06-23 RX ORDER — AMOXICILLIN AND CLAVULANATE POTASSIUM 875; 125 MG/1; MG/1
1 TABLET, FILM COATED ORAL EVERY 12 HOURS
Status: DISCONTINUED | OUTPATIENT
Start: 2023-06-23 | End: 2023-06-23 | Stop reason: HOSPADM

## 2023-06-23 RX ORDER — AMOXICILLIN AND CLAVULANATE POTASSIUM 875; 125 MG/1; MG/1
1 TABLET, FILM COATED ORAL EVERY 12 HOURS
Qty: 8 TABLET | Refills: 0 | Status: ACTIVE | OUTPATIENT
Start: 2023-06-23 | End: 2023-06-27

## 2023-06-23 RX ADMIN — CELECOXIB 100 MG: 100 CAPSULE ORAL at 04:35

## 2023-06-23 RX ADMIN — ACETAMINOPHEN 1000 MG: 500 TABLET, FILM COATED ORAL at 16:11

## 2023-06-23 RX ADMIN — OXYCODONE HYDROCHLORIDE 5 MG: 5 TABLET ORAL at 04:35

## 2023-06-23 RX ADMIN — ACETAMINOPHEN 1000 MG: 500 TABLET, FILM COATED ORAL at 09:04

## 2023-06-23 RX ADMIN — SENNOSIDES AND DOCUSATE SODIUM 2 TABLET: 50; 8.6 TABLET ORAL at 04:35

## 2023-06-23 RX ADMIN — AMOXICILLIN AND CLAVULANATE POTASSIUM 1 TABLET: 875; 125 TABLET, FILM COATED ORAL at 16:11

## 2023-06-23 RX ADMIN — AMPICILLIN AND SULBACTAM 3 G: 1; 2 INJECTION, POWDER, FOR SOLUTION INTRAMUSCULAR; INTRAVENOUS at 04:38

## 2023-06-23 ASSESSMENT — COGNITIVE AND FUNCTIONAL STATUS - GENERAL
SUGGESTED CMS G CODE MODIFIER MOBILITY: CJ
WALKING IN HOSPITAL ROOM: A LITTLE
DAILY ACTIVITIY SCORE: 19
PERSONAL GROOMING: A LITTLE
STANDING UP FROM CHAIR USING ARMS: A LITTLE
DRESSING REGULAR LOWER BODY CLOTHING: A LITTLE
DRESSING REGULAR UPPER BODY CLOTHING: A LITTLE
HELP NEEDED FOR BATHING: A LITTLE
MOBILITY SCORE: 21
CLIMB 3 TO 5 STEPS WITH RAILING: A LITTLE
TOILETING: A LITTLE
SUGGESTED CMS G CODE MODIFIER DAILY ACTIVITY: CK

## 2023-06-23 ASSESSMENT — PAIN DESCRIPTION - PAIN TYPE
TYPE: CHRONIC PAIN
TYPE: CHRONIC PAIN

## 2023-06-23 ASSESSMENT — GAIT ASSESSMENTS
GAIT LEVEL OF ASSIST: SUPERVISED
ASSISTIVE DEVICE: FRONT WHEEL WALKER
DISTANCE (FEET): 500

## 2023-06-23 ASSESSMENT — ACTIVITIES OF DAILY LIVING (ADL): TOILETING: INDEPENDENT

## 2023-06-23 NOTE — CARE PLAN
Received bedside report from noc RN. Pt aaox4, pleasant and conversant, pt blind on both eyes, able to see shadows per pt. Pt on room air, in NAD, VSS, denies pain at this time. Safety precaution in place, bed on the lowest position with brakes on, bed alarm in place, call light within reach, on hourly rounding. No needs at this time.     The patient is Stable - Low risk of patient condition declining or worsening    Shift Goals  Clinical Goals: Vascular US, IV abx  Patient Goals: Ultrasound, to go home  Family Goals: n/a    Progress made toward(s) clinical / shift goals:  US completed. IV abx switched to  PO, discharge order in place.     Problem: Knowledge Deficit - Standard  Goal: Patient and family/care givers will demonstrate understanding of plan of care, disease process/condition, diagnostic tests and medications  Outcome: Progressing     Problem: Fall Risk  Goal: Patient will remain free from falls  Outcome: Progressing     Problem: Pain - Standard  Goal: Alleviation of pain or a reduction in pain to the patient’s comfort goal  Outcome: Progressing     Problem: Skin Integrity  Goal: Skin integrity is maintained or improved  Outcome: Progressing       Patient is not progressing towards the following goals:

## 2023-06-23 NOTE — PROGRESS NOTES
I have discussed goal of care with patient today. He would like DNR/DNI.     DNAR: I discussed code status with patient  who at time of discussion had medical decision making capacity. The patient wishes to be DNAR and under no circumstances would want life sustaining treatments in the event that he has a cardiac arrest. I also discussed intubation including temporary courses and he does not wish to be placed on a ventilator under any circumstances, including for temporary and reversible causes. This discussion was had with RN in the room.    I discussed advance care planning with the patient for 18 minutes, including diagnosis, prognosis, plan of care, risks and benefits of any therapies that could be offered, as well as alternatives including palliation and hospice, as appropriate.    POLST form filled out.

## 2023-06-23 NOTE — DISCHARGE INSTRUCTIONS
Discharge Instructions per Bethany Garcia M.D.    Please follow-up with PCP as outpatient.  Please complete antibiotics course  I have referred you to see podiatrist.  Please discuss with PCP.    Return to ER in the event of new or worsening symptoms. Please note importance of compliance and the patient has agreed to proceed with all medical recommendations and follow up plan indicated above. All medications come with benefits and risks. Risks may include permanent injury or death and these risks can be minimized with close reassessment and monitoring. Please make it to your scheduled follow ups with PCP and podiatry

## 2023-06-23 NOTE — THERAPY
Occupational Therapy   Initial Evaluation     Patient Name: June Ventura  Age:  77 y.o., Sex:  male  Medical Record #: [id number]  Today's Date: 6/23/2023     Precautions  Precautions: Fall Risk    Assessment    Patient is 77 y.o. male admitted with R foot swelling. Other pertinent medical history includes blindness, BPH, and chronic R LE swelling. Pt seen for OT evaluation and treatment. Pt donned/doffed socks using AE w/ SBA, stood to wash hands with supv, and performed toilet transfer w/ supv. Pt noted to have decreased sensation to the ulnar region of B hands/forearms that he reported is chronic. Pt reported that he is 75% blind and has been blind for several years. He reported regularly going to the optometrist. Pt is able to shadows. Pt unable to see color or read. Pt noted to identify a few large objects while walking without touching them. Pt reported that he has a friend who assists with ADLs/IADLs as needed currently, but she is moving on the 5 or 6 of July. Pt reported plans to go stay with his son who lives in a single story home in [place] after that. Pt educated regarding the role of OT and AE for LB dressing. Pt appears to be performing close to his functional baseline at this time. No further OT needs anticipated at this time.     Plan    Occupational Therapy Initial Treatment Plan   Duration: Evaluation only    DC Equipment Recommendations: Sock Aide, Reacher  Discharge Recommendations: Anticipate that the patient will have no further occupational therapy needs after discharge from the hospital      Objective     06/23/23 8736   Prior Living Situation   Prior Services   (Support from friend for ADLs and IADLs as needed)   Housing / Facility Homeless  (staying at [facility])   Equipment Owned 4-Wheel Walker   Lives with - Patient's Self Care Capacity Alone and Able to Care For Self   Comments Pt reported that he has a friend who has been supporting him with ADLs and IADLs as needed. His friend is  leaving the 5 or 6 of July. He reported he might go stay with his son who lives in [place] after the 10 of July. His son lives in a one story home. Pt unsure of other setup of home.   Prior Level of ADL Function   Self Feeding Independent   Grooming / Hygiene Independent   Bathing Independent   Dressing Requires Assist   Toileting Independent   Prior Level of IADL Function   Medication Management Requires Assist   Finances Requires Assist   Shopping Requires Assist   Prior Level Of Mobility Independent With Device in Community   Driving / Transportation Utilizes Public Transportation   Comments Pt requires assist for IADLs due to low vision.   History of Falls   History of Falls Yes   Date of Last Fall   (Pt reported multiple falls recently from tripping over objects.)   Vitals   Pulse 72   Patient BP Position Supine   Blood Pressure  (!) 145/66  (nurse aware)   Respiration 18   Pulse Oximetry 94 %   O2 (LPM) 0   O2 Delivery Device None - Room Air   Pain   Pain Scales 0 to 10 Scale    Pain 0 - 10 Group   Therapist Pain Assessment Post Activity Pain Same as Prior to Activity;Nurse Notified;0   Cognition    Cognition / Consciousness WDL   Comments Pleasant, cooperative, alert   Passive ROM Upper Body   Passive ROM Upper Body WDL   Active ROM Upper Body   Active ROM Upper Body  WDL   Strength Upper Body   Upper Body Strength  WDL   Sensation Upper Body   Upper Extremity Sensation  X   Comments Impairements to ulnar nerve region of hand and forearm on B UE.   Upper Body Muscle Tone   Upper Body Muscle Tone  WDL   Coordination Upper Body   Comments Rapid alternating movement WFL   Balance Assessment   Sitting Balance (Static) Fair +   Sitting Balance (Dynamic) Fair   Standing Balance (Static) Fair   Standing Balance (Dynamic) Fair   Weight Shift Sitting Fair   Weight Shift Standing Fair   Comments w/ no AD, no LOB   Bed Mobility    Supine to Sit Supervised   Sit to Supine Supervised   Scooting Supervised   Rolling  Supervised   Comments HOB flat, no use of rails   ADL Assessment   Grooming Supervision;Standing  (washed hands at sink)   Lower Body Dressing Standby Assist  (using sock aid and reacher)   Toileting   (toilet transfer only)   Functional Mobility   Sit to Stand Supervised   Bed, Chair, Wheelchair Transfer Supervised   Toilet Transfers Supervised   Transfer Method Stand Step   Mobility EOB>bathroom>bed   Comments w/ no AD   Visual Perception   Visual Perception  X   Comments Pt reported that he has 75% vision loss. Reported ability to see shadows, but not color. Used touch to identify objects. Pt noted to be able to identify a few larger objects while walking without touching them. Pt reported going to the optometrist regularly.   Activity Tolerance   Sitting in Chair NT   Sitting Edge of Bed >5 min   Standing <10 min   Comments no overt pain or fatigue, no noted increased work of breathing   Education Group   Education Provided Role of Occupational Therapist;Activities of Daily Living;Adaptive Equipment   Role of Occupational Therapist Patient Response Patient;Acceptance;Explanation;Verbal Demonstration   ADL Patient Response Patient;Acceptance;Explanation;Demonstration;Verbal Demonstration;Action Demonstration   Adaptive Equipment Patient Response Patient;Acceptance;Explanation;Demonstration;Verbal Demonstration;Action Demonstration;Handout   Occupational Therapy Initial Treatment Plan    Duration Evaluation only

## 2023-06-23 NOTE — CARE PLAN
The patient is Stable - Low risk of patient condition declining or worsening    Shift Goals  Clinical Goals: IV antibiotics, RLE US  Patient Goals: US    Progress made toward(s) clinical / shift goals:  Pending RLE US, continues on IV antibiotics to RLE cellulitis, needed items within reach, able to make his needs known.     Patient is not progressing towards the following goals: N/A    Problem: Knowledge Deficit - Standard  Goal: Patient and family/care givers will demonstrate understanding of plan of care, disease process/condition, diagnostic tests and medications  Outcome: Progressing     Problem: Fall Risk  Goal: Patient will remain free from falls  Outcome: Progressing     Problem: Pain - Standard  Goal: Alleviation of pain or a reduction in pain to the patient’s comfort goal  Outcome: Progressing     Problem: Skin Integrity  Goal: Skin integrity is maintained or improved  Outcome: Progressing

## 2023-06-23 NOTE — CONSULTS
LPS Note  LPS in to see pt for possible wounds to feet.  Pt has a superficial excoriated area to mid plantar R foot.  No drainage, no erythema.  No wound care required.  Inspected feet including between toes for wounds.  None found.  Pt has long toe nails with onychomycosis.  Nail trim ordered.  LPS signing off.  Please re consult as needs arise.

## 2023-06-23 NOTE — DISCHARGE SUMMARY
"Discharge Summary    CHIEF COMPLAINT ON ADMISSION  Chief Complaint   Patient presents with    Foot Swelling     Medical staff at [facility] noticed pt's R foot/leg swelling today. Pt has a wound on the bottom of his R foot. Unknown how long it's been there. Pt also c/o R ankle pain after \"twisting\" it yesterday.    Leg Swelling     R leg swelling. CMS intact.       Reason for Admission  EMS     Admission Date  6/20/2023    CODE STATUS  DNAR/DNI    HPI & HOSPITAL COURSE  77-year-old homeless male with a past medical history of BPH, blindness and chronic right lower extremity swelling presents emergency department on 6/20/2023 with acutely worsening chronic right lower extremity inflammation.  R foot xray: No fracture or dislocation.  Diffuse soft tissue swelling.  RLE doppler negative for DVT.   He is noted to have superficial excoriated area to mid plantar R foot. He was started on iv Unasyn, which was transition to Augmentin to complete the course.   Patient is evaluated by PT and OT.  I advised patient to follow-up with PCP and podiatry as outpatient . I have placed podiatry referral.   Of note, I have discussed goal of care with patient.  Patient would like DNR/DNI.  POLST filled out  Therefore, he is discharged in fair and stable condition to home with close outpatient follow-up.    The patient met 2-midnight criteria for an inpatient stay at the time of discharge.    Discharge Date  6/23/23    FOLLOW UP ITEMS POST DISCHARGE  PCP  Podiatry     DISCHARGE DIAGNOSES  Principal Problem:    Cellulitis of great toe of right foot (POA: Yes)  Active Problems:    Tobacco abuse (POA: Yes)    BPH (benign prostatic hyperplasia) (POA: Yes)    Stage 3a chronic kidney disease (HCC) (POA: Yes)    Homeless (POA: Yes)    Cellulitis (POA: Yes)  Resolved Problems:    * No resolved hospital problems. *      FOLLOW UP  No future appointments.  No follow-up provider specified.    MEDICATIONS ON DISCHARGE     Medication List    "     START taking these medications        Instructions   amoxicillin-clavulanate 875-125 MG Tabs  Commonly known as: AUGMENTIN   Take 1 Tablet by mouth every 12 hours for 4 days.  Dose: 1 Tablet            CONTINUE taking these medications        Instructions   acetaminophen 500 MG Tabs  Commonly known as: TYLENOL   Take 500-1,000 mg by mouth every 6 hours as needed. Indications: Pain  Dose: 500-1,000 mg     celecoxib 100 MG Caps  Commonly known as: CELEBREX   Take 100 mg by mouth every day.  Dose: 100 mg     HYDROcodone/acetaminophen  MG Tabs  Commonly known as: NORCO   Take 1 tablet by mouth 3 times daily as needed     ibuprofen 200 MG Tabs  Commonly known as: MOTRIN   Take 600 mg by mouth every 6 hours as needed. Indications: Pain  Dose: 600 mg              Allergies  Allergies   Allergen Reactions    Talwin Unspecified     aggression and violence       DIET  Orders Placed This Encounter   Procedures    Diet Order Diet: Regular     Standing Status:   Standing     Number of Occurrences:   1     Order Specific Question:   Diet:     Answer:   Regular [1]       ACTIVITY  As tolerated.  Weight bearing as tolerated    CONSULTATIONS  na    PROCEDURES  na    LABORATORY  Lab Results   Component Value Date    SODIUM 139 06/22/2023    POTASSIUM 4.3 06/22/2023    CHLORIDE 106 06/22/2023    CO2 22 06/22/2023    GLUCOSE 90 06/22/2023    BUN 12 06/22/2023    CREATININE 0.95 06/22/2023        Lab Results   Component Value Date    WBC 5.5 06/22/2023    HEMOGLOBIN 14.5 06/22/2023    HEMATOCRIT 42.4 06/22/2023    PLATELETCT 238 06/22/2023      US-EXTREMITY VENOUS LOWER UNILAT RIGHT   Final Result      EC-ECHOCARDIOGRAM COMPLETE W/O CONT   Final Result      DX-FOOT-COMPLETE 3+ RIGHT   Final Result      1.  No fracture or dislocation of RIGHT foot.   2.  Diffuse soft tissue swelling.   3.  Moderate osteoarthritis.          Total time of the discharge process 28 minutes.

## 2023-06-24 NOTE — PROGRESS NOTES
Pt discharged with all belongings and prescribed medication. Bus pass given to pt and escorted by staff.

## 2023-06-24 NOTE — THERAPY
Physical Therapy   Initial Evaluation     Patient Name: June Ventura  Age:  77 y.o., Sex:  male  Medical Record #: [id number]  Today's Date: 6/24/2023     Precautions  Precautions: Fall Risk    Assessment  Pt presents with impaired vision and skin intergrity associated with chief complaint right foot swelling after GLF, found to have cellulitis of big toe. Pt able to demonstrate baseline mobility for community ambulation with 4ww; does have some visual deficits but able to negotiate hallway obstacles without cueing or assist; recommend dc home when medically appropriate to do so, no PT needs identified.     Plan         DC Equipment Recommendations: None  Discharge Recommendations: Anticipate that the patient will have no further physical therapy needs after discharge from the hospital       Abridged Subjective/Objective     06/23/23 1520   Prior Living Situation   Housing / Facility Homeless   Equipment Owned 4-Wheel Walker   Lives with - Patient's Self Care Capacity Alone and Able to Care For Self   Comments stays at [facility]; his girlfriend assists him bu she will be moving in a few weeks; he may go to [facility] to be with his son;   Cognition    Cognition / Consciousness WDL   Comments pleasant and cooperative   Balance Assessment   Sitting Balance (Static) Good   Sitting Balance (Dynamic) Fair +   Standing Balance (Static) Fair +   Standing Balance (Dynamic) Fair   Weight Shift Sitting Good   Weight Shift Standing Fair   Comments B UE support in sitting/standing; no loss of balance in moving enivonrmnet   Bed Mobility    Supine to Sit Modified Independent   Sit to Supine Modified Independent   Gait Analysis   Gait Level Of Assist Supervised   Assistive Device Front Wheel Walker   Distance (Feet) 500   # of Times Distance was Traveled 1   Weight Bearing Status full   Vision Deficits Impacting Mobility has '70 percent of vision' does not appear to impact obstacle negotitation   Functional Mobility   Sit to  Stand Supervised   Bed, Chair, Wheelchair Transfer Supervised   Education Group   Role of Physical Therapist Patient Response Patient;Acceptance;Explanation;Demonstration;Verbal Demonstration;Action Demonstration
